# Patient Record
Sex: FEMALE | Race: ASIAN | NOT HISPANIC OR LATINO
[De-identification: names, ages, dates, MRNs, and addresses within clinical notes are randomized per-mention and may not be internally consistent; named-entity substitution may affect disease eponyms.]

---

## 2017-06-01 ENCOUNTER — APPOINTMENT (OUTPATIENT)
Dept: ORTHOPEDIC SURGERY | Facility: CLINIC | Age: 42
End: 2017-06-01

## 2017-06-01 VITALS
DIASTOLIC BLOOD PRESSURE: 81 MMHG | HEIGHT: 61 IN | WEIGHT: 140 LBS | SYSTOLIC BLOOD PRESSURE: 125 MMHG | BODY MASS INDEX: 26.43 KG/M2 | HEART RATE: 74 BPM

## 2017-06-01 DIAGNOSIS — M17.11 UNILATERAL PRIMARY OSTEOARTHRITIS, RIGHT KNEE: ICD-10-CM

## 2017-06-01 DIAGNOSIS — M23.303 OTHER MENISCUS DERANGEMENTS, UNSPECIFIED MEDIAL MENISCUS, RIGHT KNEE: ICD-10-CM

## 2017-06-19 PROBLEM — M23.303 DERANGEMENT OF MEDIAL MENISCUS OF RIGHT KNEE: Status: ACTIVE | Noted: 2017-06-19

## 2017-06-19 PROBLEM — M17.11 PRIMARY LOCALIZED OSTEOARTHRITIS OF RIGHT KNEE: Status: ACTIVE | Noted: 2017-06-19

## 2017-07-24 ENCOUNTER — OUTPATIENT (OUTPATIENT)
Dept: OUTPATIENT SERVICES | Facility: HOSPITAL | Age: 42
LOS: 1 days | End: 2017-07-24

## 2017-07-24 VITALS
DIASTOLIC BLOOD PRESSURE: 70 MMHG | RESPIRATION RATE: 16 BRPM | WEIGHT: 149.91 LBS | OXYGEN SATURATION: 97 % | TEMPERATURE: 98 F | SYSTOLIC BLOOD PRESSURE: 120 MMHG | HEART RATE: 79 BPM | HEIGHT: 61 IN

## 2017-07-24 DIAGNOSIS — S83.209A UNSPECIFIED TEAR OF UNSPECIFIED MENISCUS, CURRENT INJURY, UNSPECIFIED KNEE, INITIAL ENCOUNTER: ICD-10-CM

## 2017-07-24 DIAGNOSIS — Z98.890 OTHER SPECIFIED POSTPROCEDURAL STATES: Chronic | ICD-10-CM

## 2017-07-24 DIAGNOSIS — S83.249A OTHER TEAR OF MEDIAL MENISCUS, CURRENT INJURY, UNSPECIFIED KNEE, INITIAL ENCOUNTER: ICD-10-CM

## 2017-07-24 DIAGNOSIS — J45.909 UNSPECIFIED ASTHMA, UNCOMPLICATED: ICD-10-CM

## 2017-07-24 LAB
HCT VFR BLD CALC: 38.1 % — SIGNIFICANT CHANGE UP (ref 34.5–45)
HGB BLD-MCNC: 12.5 G/DL — SIGNIFICANT CHANGE UP (ref 11.5–15.5)
MCHC RBC-ENTMCNC: 29.4 PG — SIGNIFICANT CHANGE UP (ref 27–34)
MCHC RBC-ENTMCNC: 32.8 % — SIGNIFICANT CHANGE UP (ref 32–36)
MCV RBC AUTO: 89.6 FL — SIGNIFICANT CHANGE UP (ref 80–100)
NRBC # FLD: 0 — SIGNIFICANT CHANGE UP
PLATELET # BLD AUTO: 322 K/UL — SIGNIFICANT CHANGE UP (ref 150–400)
PMV BLD: 10.7 FL — SIGNIFICANT CHANGE UP (ref 7–13)
RBC # BLD: 4.25 M/UL — SIGNIFICANT CHANGE UP (ref 3.8–5.2)
RBC # FLD: 13.4 % — SIGNIFICANT CHANGE UP (ref 10.3–14.5)
WBC # BLD: 12.84 K/UL — HIGH (ref 3.8–10.5)
WBC # FLD AUTO: 12.84 K/UL — HIGH (ref 3.8–10.5)

## 2017-07-24 NOTE — H&P PST ADULT - NSANTHOSAYNRD_GEN_A_CORE
No. GAYLE screening performed.  STOP BANG Legend: 0-2 = LOW Risk; 3-4 = INTERMEDIATE Risk; 5-8 = HIGH Risk

## 2017-07-24 NOTE — H&P PST ADULT - PROBLEM SELECTOR PLAN 1
Pt. is scheduled for right knee arthroscopy, medial meniscectomy on 8/8/17.  Preoperative instructions reviewed, pt verbalized understanding.  Preop Famotidine provided.  Lab results pending

## 2017-07-24 NOTE — H&P PST ADULT - PMH
Asthma  last inhaler use  2005  Meniscus tear  right knee pain  Migraine    RA (rheumatoid arthritis) Asthma  last inhaler use  2005  Meniscus tear  right knee pain since April 2017  Migraine    RA (rheumatoid arthritis)

## 2017-07-24 NOTE — H&P PST ADULT - NEGATIVE GENERAL GENITOURINARY SYMPTOMS
normal urinary frequency/no renal colic/no bladder infections/no flank pain L/no hematuria/no flank pain R

## 2017-07-24 NOTE — H&P PST ADULT - FAMILY HISTORY
Father  Still living? Yes, Estimated age: Age Unknown  Family history of diabetes mellitus, Age at diagnosis: Age Unknown     Mother  Still living? Yes, Estimated age: Age Unknown  Family history of diabetes mellitus, Age at diagnosis: Age Unknown

## 2017-07-24 NOTE — H&P PST ADULT - HISTORY OF PRESENT ILLNESS
40 y/o female with history of right knee pain, climbing stairs, knee popped in April alot of pain all the time, pain is worse with stair climbing, taking Advil PRN.  MRI done, cristobal manzano 40 y/o female with right knee meniscus tear presents to PAST today for presurgical evaluation.  She complains that in April when she was climbing the stairs, she felt something "pop" in her right knee and she now has pain in her right knee all the time.  The pain is worse when she climbs stairs and sometimes the knee basil when she walks.  She is taking Advil PRN for pain.  MRI was done.  She is scheduled for right knee arthroscopy, medial meniscectomy on 8/8/17.

## 2017-08-08 ENCOUNTER — OUTPATIENT (OUTPATIENT)
Dept: OUTPATIENT SERVICES | Facility: HOSPITAL | Age: 42
LOS: 1 days | Discharge: HOME | End: 2017-08-08
Payer: COMMERCIAL

## 2017-08-08 ENCOUNTER — APPOINTMENT (OUTPATIENT)
Dept: ORTHOPEDIC SURGERY | Facility: HOSPITAL | Age: 42
End: 2017-08-08

## 2017-08-08 VITALS
OXYGEN SATURATION: 99 % | RESPIRATION RATE: 14 BRPM | SYSTOLIC BLOOD PRESSURE: 102 MMHG | DIASTOLIC BLOOD PRESSURE: 43 MMHG | TEMPERATURE: 98 F | HEART RATE: 68 BPM

## 2017-08-08 VITALS
DIASTOLIC BLOOD PRESSURE: 51 MMHG | OXYGEN SATURATION: 100 % | TEMPERATURE: 98 F | SYSTOLIC BLOOD PRESSURE: 112 MMHG | HEIGHT: 61 IN | RESPIRATION RATE: 16 BRPM | WEIGHT: 149.03 LBS | HEART RATE: 81 BPM

## 2017-08-08 DIAGNOSIS — S83.249A OTHER TEAR OF MEDIAL MENISCUS, CURRENT INJURY, UNSPECIFIED KNEE, INITIAL ENCOUNTER: ICD-10-CM

## 2017-08-08 DIAGNOSIS — Z98.890 OTHER SPECIFIED POSTPROCEDURAL STATES: Chronic | ICD-10-CM

## 2017-08-08 LAB — HCG UR QL: NEGATIVE — SIGNIFICANT CHANGE UP

## 2017-08-08 PROCEDURE — 29880 ARTHRS KNE SRG MNISECTMY M&L: CPT | Mod: LT

## 2017-08-08 RX ORDER — TRAMADOL HYDROCHLORIDE 50 MG/1
1 TABLET ORAL
Qty: 30 | Refills: 0
Start: 2017-08-08 | End: 2017-08-15

## 2017-08-08 RX ORDER — SODIUM CHLORIDE 9 MG/ML
1000 INJECTION, SOLUTION INTRAVENOUS
Qty: 0 | Refills: 0 | Status: DISCONTINUED | OUTPATIENT
Start: 2017-08-08 | End: 2017-08-08

## 2017-08-08 RX ORDER — SODIUM CHLORIDE 9 MG/ML
1000 INJECTION, SOLUTION INTRAVENOUS
Qty: 0 | Refills: 0 | Status: DISCONTINUED | OUTPATIENT
Start: 2017-08-08 | End: 2017-08-23

## 2017-08-08 RX ORDER — ONDANSETRON 8 MG/1
4 TABLET, FILM COATED ORAL ONCE
Qty: 0 | Refills: 0 | Status: DISCONTINUED | OUTPATIENT
Start: 2017-08-08 | End: 2017-08-08

## 2017-08-08 RX ORDER — FENTANYL CITRATE 50 UG/ML
50 INJECTION INTRAVENOUS
Qty: 0 | Refills: 0 | Status: DISCONTINUED | OUTPATIENT
Start: 2017-08-08 | End: 2017-08-08

## 2017-08-08 RX ORDER — OXYCODONE AND ACETAMINOPHEN 5; 325 MG/1; MG/1
1 TABLET ORAL
Qty: 40 | Refills: 0
Start: 2017-08-08

## 2017-08-08 RX ORDER — FENTANYL CITRATE 50 UG/ML
25 INJECTION INTRAVENOUS
Qty: 0 | Refills: 0 | Status: DISCONTINUED | OUTPATIENT
Start: 2017-08-08 | End: 2017-08-08

## 2017-08-08 RX ORDER — OXYCODONE HYDROCHLORIDE 5 MG/1
2.5 TABLET ORAL ONCE
Qty: 0 | Refills: 0 | Status: DISCONTINUED | OUTPATIENT
Start: 2017-08-08 | End: 2017-08-08

## 2017-08-08 RX ADMIN — SODIUM CHLORIDE 100 MILLILITER(S): 9 INJECTION, SOLUTION INTRAVENOUS at 09:05

## 2017-08-08 RX ADMIN — FENTANYL CITRATE 50 MICROGRAM(S): 50 INJECTION INTRAVENOUS at 08:50

## 2017-08-08 RX ADMIN — FENTANYL CITRATE 50 MICROGRAM(S): 50 INJECTION INTRAVENOUS at 09:18

## 2017-08-08 RX ADMIN — FENTANYL CITRATE 50 MICROGRAM(S): 50 INJECTION INTRAVENOUS at 09:00

## 2017-08-08 RX ADMIN — FENTANYL CITRATE 50 MICROGRAM(S): 50 INJECTION INTRAVENOUS at 09:30

## 2017-08-08 NOTE — BRIEF OPERATIVE NOTE - OPERATION/FINDINGS
Preop: R knee medial meniscal tear  postop: same  Proc: R knee partial medial menisectomy and lateral chondroplasty  Findings: R knee complex posterior horn tear and medial chondral wear

## 2017-08-08 NOTE — ASU PATIENT PROFILE, ADULT - NSALCOHOLPROBLEMSRELYN_GEN_A_CORE_SD
Provider





- Provider


Date of Admission: 


04/20/17 16:15





Attending physician: 


Sundar Ellington MD





Consults: 


Surgery : Dr Prieto


Time Spent in preparation of Discharge (in minutes): 20





Diagnosis





- Discharge Diagnosis


(1) Appendicitis


Status: Acute





(2) S/P laparoscopic appendectomy


Status: Acute








Hospital Course





- Lab Results


Lab Results: 


 Most Recent Lab Values











WBC  5.9 K/uL (4.8-10.8)   04/21/17  06:44    


 


RBC  3.85 Mil/uL (3.80-5.20)   04/21/17  06:44    


 


Hgb  11.5 g/dL (12.0-16.0)  L  04/21/17  06:44    


 


Hct  35.0 % (34.0-47.0)   04/21/17  06:44    


 


MCV  90.9 fl (81.0-99.0)   04/21/17  06:44    


 


MCH  30.0 pg (27.0-31.0)   04/21/17  06:44    


 


MCHC  33.0 g/dL (33.0-37.0)   04/21/17  06:44    


 


RDW  12.9 % (11.5-14.5)   04/21/17  06:44    


 


Plt Count  177 K/uL (130-400)   04/21/17  06:44    


 


MPV  8.6 fl (7.2-11.7)   04/21/17  06:44    


 


Neut % (Auto)  88.2 % (50.0-75.0)  H  04/21/17  06:44    


 


Lymph % (Auto)  9.1 % (20.0-40.0)  L  04/21/17  06:44    


 


Mono % (Auto)  2.6 % (0.0-10.0)   04/21/17  06:44    


 


Eos % (Auto)  0.0 % (0.0-4.0)   04/21/17  06:44    


 


Baso % (Auto)  0.1 % (0.0-2.0)   04/21/17  06:44    


 


Neut #  5.2 K/uL (1.8-7.0)   04/21/17  06:44    


 


Lymph #  0.5 K/uL (1.0-4.3)  L  04/21/17  06:44    


 


Mono #  0.2 K/uL (0.0-0.8)   04/21/17  06:44    


 


Eos #  0.0 K/uL (0.0-0.7)   04/21/17  06:44    


 


Baso #  0.0 K/uL (0.0-0.2)   04/21/17  06:44    


 


Neutrophils % (Manual)  86 % (42-75)  H  04/21/17  06:44    


 


Lymphocytes % (Manual)  10 % (20-50)  L  04/21/17  06:44    


 


Monocytes % (Manual)  4 % (0-10)   04/21/17  06:44    


 


Platelet Estimate  Normal  (NORMAL)   04/21/17  06:44    


 


Hypochromasia (manual)  Slight   04/21/17  06:44    


 


Anisocytosis (manual)  Slight   04/21/17  06:44    


 


Sodium  136 mmol/l (132-148)   04/21/17  06:44    


 


Potassium  4.7 MMOL/L (3.6-5.0)   04/21/17  06:44    


 


Chloride  104 mmol/L ()   04/21/17  06:44    


 


Carbon Dioxide  23 mmol/L (22-30)   04/21/17  06:44    


 


Anion Gap  14  (10-20)   04/21/17  06:44    


 


BUN  9 mg/dl (7-17)   04/21/17  06:44    


 


Creatinine  0.6 mg/dL (0.7-1.2)  L  04/21/17  06:44    


 


Est GFR ( Amer)  > 60   04/21/17  06:44    


 


Est GFR (Non-Af Amer)  > 60   04/21/17  06:44    


 


Random Glucose  132 mg/dL ()  H  04/21/17  06:44    


 


Calcium  9.3 mg/dL (8.4-10.2)   04/21/17  06:44    


 


Total Bilirubin  0.8 mg/dl (0.2-1.3)   04/20/17  12:40    


 


AST  45 U/L (14-36)  H  04/20/17  12:40    


 


ALT  68 U/L (9-52)  H  04/20/17  12:40    


 


Alkaline Phosphatase  70 U/L ()   04/20/17  12:40    


 


Total Protein  7.8 G/DL (6.3-8.2)   04/20/17  12:40    


 


Albumin  4.3 g/dL (3.5-5.0)   04/20/17  12:40    


 


Globulin  3.5 gm/dL (2.2-3.9)   04/20/17  12:40    


 


Albumin/Globulin Ratio  1.2  (1.0-2.1)   04/20/17  12:40    














- Hospital Course


Hospital Course: 


25 y/o lady , no significant PMH, came in bec of lower abdominal pain.    CT of 

the abdomen done showed Acute Appendicitis.


Pt was dmitted to Med Surg,  kept NPO, strated on IVF hydration, IV Zosyn and 

Pain meds.    Surgery was consulted and pt undewernt Laparoscopic Appendectomy.


No post op complication.  Pain controlled.  Pt tolerated PO diet.





Discharge Exam





- Head Exam


Head Exam: ATRAUMATIC, NORMAL INSPECTION, NORMOCEPHALIC





- Eye Exam


Eye Exam: EOMI, Normal appearance, PERRL


Pupil Exam: NORMAL ACCOMODATION





- ENT Exam


ENT Exam: Mucous Membranes Moist, Normal External Ear Exam





- Neck Exam


Neck exam: Full Rom





- Respiratory Exam


Respiratory Exam: NORMAL BREATHING PATTERN.  absent: Respiratory Distress





- Cardiovascular Exam


Cardiovascular Exam: REGULAR RHYTHM, +S1, +S2





- GI/Abdominal Exam


GI & Abdominal Exam: Normal Bowel Sounds, Soft, Tenderness (very minimal lower 

abd tenderness,  small Lap AP surgical wound with dressing)





- Extremities Exam


Extremities exam: full ROM, normal capillary refill, normal inspection, pedal 

pulses present





- Back Exam


Back exam: FULL ROM, NORMAL INSPECTION.  absent: CVA tenderness (L), CVA 

tenderness (R), vertebral tenderness





- Neurological Exam


Neurological exam: Alert, CN II-XII Intact, Normal Gait, Oriented x3, Reflexes 

Normal





- Psychiatric Exam


Psychiatric exam: Normal Affect, Normal Mood





- Skin


Skin Exam: Dry, Normal Color, Warm





Discharge Plan





- Discharge Medications


Prescriptions: 


Amoxicillin/Clavulanate [Augmentin 875 MG-125 MG] 1 tab PO BID #10 tab


traMADol [Ultram] 50 mg PO TID PRN #15 tab


 PRN Reason: Pain, Moderate (4-7)





- Follow Up Plan


Condition: GOOD


Disposition: HOME/ ROUTINE


Instructions:  Appendicitis (DC)


Additional Instructions: 


Pt may resume regular diet and light activities, avoid any heavy lifting >10lbs 

for 4 weeks.  Pt may take Motrin or Tyenol as needed for pain.  She may shower 

and wash with soap and water, do not remove dermabond glue and no bathing or 

swimming.  F/U in office with Dr. Prieto in 2 weeks.


Referrals: 


[facility] [Outside]


Michael Prieto MD [Staff Provider] - no

## 2017-08-08 NOTE — ASU PATIENT PROFILE, ADULT - PMH
Asthma  last inhaler use  2005  Meniscus tear  right knee pain since April 2017  Migraine    RA (rheumatoid arthritis)

## 2017-08-08 NOTE — ASU DISCHARGE PLAN (ADULT/PEDIATRIC). - MEDICATION SUMMARY - MEDICATIONS TO TAKE
I will START or STAY ON the medications listed below when I get home from the hospital:    Percocet 5/325 325 mg-5 mg oral tablet  -- 1-2 tab(s) by mouth every 4- 6 hours, As Needed for pain MDD:6  -- Caution federal law prohibits the transfer of this drug to any person other  than the person for whom it was prescribed.  May cause drowsiness.  Alcohol may intensify this effect.  Use care when operating dangerous machinery.  This prescription cannot be refilled.  This product contains acetaminophen.  Do not use  with any other product containing acetaminophen to prevent possible liver damage.  Using more of this medication than prescribed may cause serious breathing problems.    -- Indication: For pain    traMADol 50 mg oral tablet  -- 1 tab(s) by mouth every 4 hours, As Needed MDD:6  -- Caution federal law prohibits the transfer of this drug to any person other  than the person for whom it was prescribed.  May cause drowsiness.  Alcohol may intensify this effect.  Use care when operating dangerous machinery.  Obtain medical advice before taking any non-prescription drugs as some may affect the action of this medication.    -- Indication: For pain    Ventolin HFA 90 mcg/inh inhalation aerosol  -- 2 puff(s) inhaled 4 times a day, As Needed  -- Indication: For home med

## 2017-08-09 ENCOUNTER — TRANSCRIPTION ENCOUNTER (OUTPATIENT)
Age: 42
End: 2017-08-09

## 2017-08-24 ENCOUNTER — APPOINTMENT (OUTPATIENT)
Dept: ORTHOPEDIC SURGERY | Facility: CLINIC | Age: 42
End: 2017-08-24
Payer: MEDICAID

## 2017-08-24 VITALS
HEIGHT: 61 IN | HEART RATE: 72 BPM | DIASTOLIC BLOOD PRESSURE: 80 MMHG | BODY MASS INDEX: 26.43 KG/M2 | WEIGHT: 140 LBS | SYSTOLIC BLOOD PRESSURE: 122 MMHG

## 2017-08-24 PROCEDURE — 99024 POSTOP FOLLOW-UP VISIT: CPT

## 2017-09-28 ENCOUNTER — APPOINTMENT (OUTPATIENT)
Dept: ORTHOPEDIC SURGERY | Facility: CLINIC | Age: 42
End: 2017-09-28
Payer: MEDICAID

## 2017-09-28 VITALS — SYSTOLIC BLOOD PRESSURE: 121 MMHG | DIASTOLIC BLOOD PRESSURE: 81 MMHG | HEART RATE: 79 BPM

## 2017-09-28 DIAGNOSIS — Z98.890 OTHER SPECIFIED POSTPROCEDURAL STATES: ICD-10-CM

## 2017-09-28 PROCEDURE — 99024 POSTOP FOLLOW-UP VISIT: CPT

## 2017-10-23 PROBLEM — Z98.890 S/P ARTHROSCOPY OF RIGHT KNEE: Status: ACTIVE | Noted: 2017-08-24

## 2018-07-16 PROBLEM — S83.209A UNSPECIFIED TEAR OF UNSPECIFIED MENISCUS, CURRENT INJURY, UNSPECIFIED KNEE, INITIAL ENCOUNTER: Chronic | Status: ACTIVE | Noted: 2017-07-24

## 2019-03-05 NOTE — ASU PREOP CHECKLIST - NS PREOP CHK HIBICLENS NA
Physical Therapy Daily Treatment    Visit Count: 2  Plan of Care: 3/1/2019 Through: 5/10/2019  Insurance Information:  Hard Visit Limit: 75 visits per calendar year  Referred by: Linette Lr MD; Next provider visit (if known/scheduled): as needed  Precautions: none    SUBJECTIVE   Patient reports being a little sore from starting her exercises but overall is doing well.    Current Pain (0-10 scale): 5/6  Functional Change: none     OBJECTIVE       Treatment     Therapeutic Exercise:   Standing hip abduction (red band at ankles), 1 x 10 R/L  Standing hip extension (red band at ankles), 1 x 10 R/L  Q-ped hip extension - donkey kicks, 1 x 10 alternating  Seated hip ER against red resistance, 1 x 10 R/L    Manual Therapy:   Hip PROM in supine to patient tolerance   Flexion, Extension, Internal Rotation, External Rotation  Supine log rolling mobilization  Supine inferior joint mobilization with distraction   Knee bent over shoulder   With belt  Supine lateral joint mobilization with distraction   Knee bent over shoulder   With belt    Skilled input: verbal instruction/cues    Home Program:   Exercise: Date issued Date DC Comments   DL bridge  Sidelying hip abduction  Sidelying clams  Figure 4 stretch  Couch hip flexor stretch     3/1/19                                 Writer verbally educated the patient and received verbal consent from the patient on hand placement, positioning of patient, and techniques to be performed today including therapist position for techniques, hand placement and palpation for techniques as described above and how they are pertinent to the patient's plan of care.      Suggestions for next session as indicated: progress per plan of care, belt mobilizations    ASSESSMENT   Patient with improved hip flexion from 90 deg to 110 degrees following manual mobilizations during her session today.  Will review things that she can do in the pool at her next session    Pain after treatment  (patient reported, 0-10 scale): not reported  Result of above outlined education: Verbalizes understanding and Demonstrates understanding    THERAPY DAILY BILLING   Insurance: [employer] 2. N/A    Evaluation Procedures:  No evaluation codes were used on this date of service    Timed Procedures:  Manual Therapy, 25 minutes  Therapeutic Exercise, 15 minutes    Untimed Procedures:  No untimed codes were used on this date of service    Total Treatment Time: 40 minutes   N/A

## 2022-04-07 ENCOUNTER — APPOINTMENT (OUTPATIENT)
Dept: OBGYN | Facility: CLINIC | Age: 47
End: 2022-04-07
Payer: MEDICAID

## 2022-04-07 ENCOUNTER — ASOB RESULT (OUTPATIENT)
Age: 47
End: 2022-04-07

## 2022-04-07 VITALS
HEART RATE: 82 BPM | DIASTOLIC BLOOD PRESSURE: 79 MMHG | WEIGHT: 176 LBS | SYSTOLIC BLOOD PRESSURE: 137 MMHG | BODY MASS INDEX: 33.23 KG/M2 | HEIGHT: 61 IN

## 2022-04-07 DIAGNOSIS — Z83.3 FAMILY HISTORY OF DIABETES MELLITUS: ICD-10-CM

## 2022-04-07 PROCEDURE — 99203 OFFICE O/P NEW LOW 30 MIN: CPT

## 2022-04-07 PROCEDURE — 76830 TRANSVAGINAL US NON-OB: CPT

## 2022-04-07 NOTE — PHYSICAL EXAM
[FreeTextEntry7] : Obese, soft, slight guarding, no rebound [Labia Majora] : normal [Normal] : normal [FreeTextEntry6] : Unable to palpate secondary to habitus.

## 2022-04-07 NOTE — DISCUSSION/SUMMARY
[FreeTextEntry1] : 45 yo P2 w/HMB and dysmenorrhea in setting of suspected fibroids.\par \par Plan\par USG (today?)\par Pt to obtain records from EM bx (~1 yr ago)\par TEB for results and plan.

## 2022-05-05 ENCOUNTER — APPOINTMENT (OUTPATIENT)
Dept: OBGYN | Facility: CLINIC | Age: 47
End: 2022-05-05
Payer: MEDICAID

## 2022-05-05 PROCEDURE — 99213 OFFICE O/P EST LOW 20 MIN: CPT | Mod: 95

## 2022-05-05 NOTE — HISTORY OF PRESENT ILLNESS
[FreeTextEntry1] : 47 yo P2 here for f/u after USG.\par \par Pt will find and send results of EM bx from 7/2021.

## 2022-05-26 ENCOUNTER — NON-APPOINTMENT (OUTPATIENT)
Age: 47
End: 2022-05-26

## 2022-05-26 ENCOUNTER — APPOINTMENT (OUTPATIENT)
Dept: ORTHOPEDIC SURGERY | Facility: CLINIC | Age: 47
End: 2022-05-26
Payer: COMMERCIAL

## 2022-06-08 ENCOUNTER — APPOINTMENT (OUTPATIENT)
Dept: ORTHOPEDIC SURGERY | Facility: CLINIC | Age: 47
End: 2022-06-08
Payer: COMMERCIAL

## 2022-06-08 VITALS — BODY MASS INDEX: 32.39 KG/M2 | WEIGHT: 165 LBS | HEIGHT: 60 IN

## 2022-06-08 DIAGNOSIS — M25.561 PAIN IN RIGHT KNEE: ICD-10-CM

## 2022-06-08 PROCEDURE — 73562 X-RAY EXAM OF KNEE 3: CPT | Mod: RT

## 2022-06-08 PROCEDURE — 99204 OFFICE O/P NEW MOD 45 MIN: CPT

## 2022-06-09 NOTE — DISCUSSION/SUMMARY
[de-identified] : 46 year old female with mild to moderate osteoarthritis in the right knee. Patient will start with conservative treatment and treatment options were discussed including NSAIDs, ice, physical therapy, corticosteroid injections, viscosupplementation. Patient currently working with therapy. Continue home exercises. FU in 3 months.

## 2022-06-09 NOTE — HISTORY OF PRESENT ILLNESS
[de-identified] : Patient is a 46 year-old female who presents to the office today for initial evaluation of right knee pain. She has had pain for about a year. Pain has been worsening over time. She describes throbbing pain associated with swelling and stiffness. She takes Ibuprofen 600mg which used to work but not anymore. Difficulty going up and down stairs. She has noticed recently that she feels instability. She presents today for further treatment options.\par \par About 5 years ago, meniscectomies performed of bilateral knees (Dr. Little)

## 2022-06-09 NOTE — ADDENDUM
[FreeTextEntry1] : I, Walker Veras, acted solely as a scribe for Dr. Arelis Bean MD on this date 06/08/2022  .\par  \par All medical record entries made by the Scribe were at my, Dr. Arelis Bean MD , direction and personally dictated by me on 06/08/2022 . I have reviewed the chart and agree that the record accurately reflects my personal performance of the history, physical exam, assessment and plan. I have also personally directed, reviewed, and agreed with the chart.

## 2022-06-09 NOTE — PHYSICAL EXAM
[Normal RLE] : Right Lower Extremity: No scars, rashes, lesions, ulcers, skin intact [Normal LLE] : Left Lower Extremity: No scars, rashes, lesions, ulcers, skin intact [Normal Touch] : sensation intact for touch [Normal] : No respiratory distress and lungs were clear to auscultation bilaterally [de-identified] : Patient appears stated age in no acute respiratory distress. Patient is alert oriented x3. Patient has normal mood and affect. \par \par Bilateral knee exam\par Range of motion of the knee is 0-120°. \par Skin is normal. No rash.\par There is no effusion. No medial or lateral joint line tenderness. No swelling, no pitting edema.\par Overall alignment of the knee is then slight varus. Good anterior posterior stability. Firm endpoints on anterior and posterior drawer. \par Medial lateral stability is intact. Firm endpoint on medial and lateral stress testing .\par Salma test is negative.\par Quadriceps strength 5/ 5. There is no loss of muscle volume in the thigh. \par Good anterior posterior and mediolateral stability.\par Sensation in the extremities intact. \par Discrimination is intact. Good DP and PT pulses.\par 		\par Bilateral hip exam\par On inspection of the hip shows skin is normal. No evidence of rash. \par No loss of muscle. Abductor strength is 5 out of 5. Hip flexor strength is 5.\par Range of motion of the hip at 90° flexion internal rotation is 15° external rotation is 30° pain-free. \par Hip has good stability in anterior and posterior direction. \par On lateral decubitus examination there is no tenderness in the greater trochanter. \par \par Lower Extremity Examination \par Bilateral lower extremity skin is normal. There is no rash. There is no edema and lymphadenopathy. DP and PT pulses intact. Sensation is intact.  [de-identified] : X-rays of the right knee 3 views obtained today 06/08/2022 shows mild to moderate osteoarthritis.

## 2022-07-12 ENCOUNTER — APPOINTMENT (OUTPATIENT)
Dept: ORTHOPEDIC SURGERY | Facility: CLINIC | Age: 47
End: 2022-07-12

## 2022-09-29 ENCOUNTER — APPOINTMENT (OUTPATIENT)
Dept: OBGYN | Facility: CLINIC | Age: 47
End: 2022-09-29

## 2022-09-29 NOTE — DISCUSSION/SUMMARY
[FreeTextEntry1] : 45 yo P2 w/HMB, scheduled for vNOTES TLH.\par Pt now states that she would like to do combined case w/"tummy tuck" with Dr. Santoyo.\par Questions answered.\par \par Will call Dr. Santoyo to try to coordinate surgery on a different day.

## 2022-10-12 ENCOUNTER — APPOINTMENT (OUTPATIENT)
Dept: OBGYN | Facility: CLINIC | Age: 47
End: 2022-10-12

## 2022-10-14 ENCOUNTER — APPOINTMENT (OUTPATIENT)
Dept: OBGYN | Facility: HOSPITAL | Age: 47
End: 2022-10-14

## 2022-11-08 ENCOUNTER — APPOINTMENT (OUTPATIENT)
Dept: OBGYN | Facility: CLINIC | Age: 47
End: 2022-11-08

## 2022-11-29 ENCOUNTER — APPOINTMENT (OUTPATIENT)
Dept: OBGYN | Facility: CLINIC | Age: 47
End: 2022-11-29

## 2022-12-23 ENCOUNTER — OUTPATIENT (OUTPATIENT)
Dept: OUTPATIENT SERVICES | Facility: HOSPITAL | Age: 47
LOS: 1 days | End: 2022-12-23

## 2022-12-23 VITALS
WEIGHT: 166.89 LBS | HEART RATE: 80 BPM | TEMPERATURE: 97 F | HEIGHT: 60 IN | RESPIRATION RATE: 16 BRPM | SYSTOLIC BLOOD PRESSURE: 130 MMHG | DIASTOLIC BLOOD PRESSURE: 70 MMHG | OXYGEN SATURATION: 99 %

## 2022-12-23 DIAGNOSIS — D25.9 LEIOMYOMA OF UTERUS, UNSPECIFIED: ICD-10-CM

## 2022-12-23 DIAGNOSIS — Z98.890 OTHER SPECIFIED POSTPROCEDURAL STATES: Chronic | ICD-10-CM

## 2022-12-23 DIAGNOSIS — J45.909 UNSPECIFIED ASTHMA, UNCOMPLICATED: ICD-10-CM

## 2022-12-23 LAB
A1C WITH ESTIMATED AVERAGE GLUCOSE RESULT: 7.3 % — HIGH (ref 4–5.6)
ANION GAP SERPL CALC-SCNC: 14 MMOL/L — SIGNIFICANT CHANGE UP (ref 7–14)
BLD GP AB SCN SERPL QL: NEGATIVE — SIGNIFICANT CHANGE UP
BUN SERPL-MCNC: 10 MG/DL — SIGNIFICANT CHANGE UP (ref 7–23)
CALCIUM SERPL-MCNC: 9.6 MG/DL — SIGNIFICANT CHANGE UP (ref 8.4–10.5)
CHLORIDE SERPL-SCNC: 104 MMOL/L — SIGNIFICANT CHANGE UP (ref 98–107)
CO2 SERPL-SCNC: 21 MMOL/L — LOW (ref 22–31)
CREAT SERPL-MCNC: 0.52 MG/DL — SIGNIFICANT CHANGE UP (ref 0.5–1.3)
EGFR: 115 ML/MIN/1.73M2 — SIGNIFICANT CHANGE UP
ESTIMATED AVERAGE GLUCOSE: 163 — SIGNIFICANT CHANGE UP
GLUCOSE SERPL-MCNC: 97 MG/DL — SIGNIFICANT CHANGE UP (ref 70–99)
HCG SERPL-ACNC: <5 MIU/ML — SIGNIFICANT CHANGE UP
HCT VFR BLD CALC: 39.6 % — SIGNIFICANT CHANGE UP (ref 34.5–45)
HGB BLD-MCNC: 12.5 G/DL — SIGNIFICANT CHANGE UP (ref 11.5–15.5)
MCHC RBC-ENTMCNC: 28.5 PG — SIGNIFICANT CHANGE UP (ref 27–34)
MCHC RBC-ENTMCNC: 31.6 GM/DL — LOW (ref 32–36)
MCV RBC AUTO: 90.2 FL — SIGNIFICANT CHANGE UP (ref 80–100)
NRBC # BLD: 0 /100 WBCS — SIGNIFICANT CHANGE UP (ref 0–0)
NRBC # FLD: 0 K/UL — SIGNIFICANT CHANGE UP (ref 0–0)
PLATELET # BLD AUTO: 340 K/UL — SIGNIFICANT CHANGE UP (ref 150–400)
POTASSIUM SERPL-MCNC: 4 MMOL/L — SIGNIFICANT CHANGE UP (ref 3.5–5.3)
POTASSIUM SERPL-SCNC: 4 MMOL/L — SIGNIFICANT CHANGE UP (ref 3.5–5.3)
RBC # BLD: 4.39 M/UL — SIGNIFICANT CHANGE UP (ref 3.8–5.2)
RBC # FLD: 14.5 % — SIGNIFICANT CHANGE UP (ref 10.3–14.5)
RH IG SCN BLD-IMP: POSITIVE — SIGNIFICANT CHANGE UP
SODIUM SERPL-SCNC: 139 MMOL/L — SIGNIFICANT CHANGE UP (ref 135–145)
WBC # BLD: 11.62 K/UL — HIGH (ref 3.8–10.5)
WBC # FLD AUTO: 11.62 K/UL — HIGH (ref 3.8–10.5)

## 2022-12-23 RX ORDER — ALBUTEROL 90 UG/1
2 AEROSOL, METERED ORAL
Qty: 0 | Refills: 0 | DISCHARGE

## 2022-12-23 RX ORDER — SODIUM CHLORIDE 9 MG/ML
1000 INJECTION, SOLUTION INTRAVENOUS
Refills: 0 | Status: DISCONTINUED | OUTPATIENT
Start: 2023-01-06 | End: 2023-01-20

## 2022-12-23 NOTE — H&P PST ADULT - LAST ECHOCARDIOGRAM
Pt is already s/p augmentin so will change to bactrim as directed  Increase fluids  Coricidin as directed and may try sinus rinsing  Denies

## 2022-12-23 NOTE — H&P PST ADULT - NEGATIVE ENMT SYMPTOMS
Denies dentures. Denies loose teeth./no hearing difficulty/no ear pain/no tinnitus/no vertigo/no sinus symptoms/no nasal congestion/no dysphagia

## 2022-12-23 NOTE — H&P PST ADULT - HISTORY OF PRESENT ILLNESS
47 year old female with  PMH of  Asthma (not on medications- no exacerbations) presents to Presurgical testing with diagnosis of leiomyoma of uterus unspecified scheduled for total laparoscopic hysterectomy, vaginal natural orifice transluminal endoscopic surgery.

## 2022-12-23 NOTE — H&P PST ADULT - PROBLEM SELECTOR PLAN 1
Patient tentatively scheduled for total laparoscopic hysterectomy, vaginal natural orifice transluminal endoscopic surgery on 1/6/23.  Pre-op instructions provided. Pt given verbal and written instructions with teach back on chlorhexidine wash and pepcid. Pt verbalized understanding with return demonstration.   Preop Covid PCR test ordered .Instructions regarding covid PCR test to be obtained 3- 5 days prior to surgery and locations for covid testing site provided. Pt verbalized understanding.  Urine cup provided for day of procedure for pregnancy test.

## 2022-12-23 NOTE — H&P PST ADULT - NSICDXPASTMEDICALHX_GEN_ALL_CORE_FT
PAST MEDICAL HISTORY:  Asthma last inhaler use  2005    Meniscus tear right knee pain since April 2017    Migraine

## 2022-12-23 NOTE — H&P PST ADULT - CARDIOVASCULAR
Direct Admit. Dr. Mcdaniels Sat perfect served regardig admission. Pt admitted to room 3303 from ED. VSS on room air. O x 4. Pt lives home and was experiencing CP. Has been two two hospital in the las three days. Pt independent. Refused alarm. Walked 40 ft to/from bathroom; ambulated well Pt oriented to room and updated on POC. Pt understands and has no further questions. Call light and bedside table within reach. Safety socks on and  bed in lowest position with 2/4 siderails up. Telemetry verified. Report taken from Kyle Duarte at [facility]. ([phone]). Had been to ED twice in last few days for CP. HX: HTN, EGD, colonoscopy, neck pain. Given toradol, nitropaste, lorazapam. At this hospital: Sodium was 126. Pt is a chronic drinker. 1L banana bag given in ED. Pt report that he drinks 7-8 beers a day but has cut down in the last week. He was on HCTZ, but his sodium was low so his PCP d/c'd HCTZ and told him to drink less alcohol. Last beer was yesterday at 2 pm (he had two beers total yesterday). details… normal/regular rate and rhythm/S1 S2 present/no gallops/no rub/no murmur

## 2022-12-29 ENCOUNTER — APPOINTMENT (OUTPATIENT)
Dept: OBGYN | Facility: CLINIC | Age: 47
End: 2022-12-29

## 2022-12-29 PROCEDURE — 99212 OFFICE O/P EST SF 10 MIN: CPT | Mod: 95

## 2022-12-29 NOTE — HISTORY OF PRESENT ILLNESS
[FreeTextEntry1] : 47 yo P2 here for preop chat prior to vNOTES TLH for HMB and dysmenorrhea.\par \par Pt s/p visit w/primary care for med clearance.\par Also s/p PST. \par

## 2022-12-29 NOTE — DISCUSSION/SUMMARY
[FreeTextEntry1] : Case reviewed.\par Questions answered.\par Pt will keep appointment for med clearance.\par Keep appointment for surgery.

## 2023-01-05 ENCOUNTER — TRANSCRIPTION ENCOUNTER (OUTPATIENT)
Age: 48
End: 2023-01-05

## 2023-01-05 NOTE — ASU PATIENT PROFILE, ADULT - FALL HARM RISK - UNIVERSAL INTERVENTIONS
Bed in lowest position, wheels locked, appropriate side rails in place/Call bell, personal items and telephone in reach/Instruct patient to call for assistance before getting out of bed or chair/Non-slip footwear when patient is out of bed/[place] to call system/Physically safe environment - no spills, clutter or unnecessary equipment/Purposeful Proactive Rounding/Room/bathroom lighting operational, light cord in reach

## 2023-01-05 NOTE — ASU PATIENT PROFILE, ADULT - NS TRANSFER EYEGLASSES PAIRS
1 pair Thalidomide Counseling: I discussed with the patient the risks of thalidomide including but not limited to birth defects, anxiety, weakness, chest pain, dizziness, cough and severe allergy.

## 2023-01-05 NOTE — ASU PATIENT PROFILE, ADULT - HEALTHCARE QUESTIONS, PROFILE
[FreeTextEntry1] : \par -Patient healing well\par -Postoperative precautions given\par Further care with benign gyn\par 
denies

## 2023-01-06 ENCOUNTER — TRANSCRIPTION ENCOUNTER (OUTPATIENT)
Age: 48
End: 2023-01-06

## 2023-01-06 ENCOUNTER — APPOINTMENT (OUTPATIENT)
Dept: OBGYN | Facility: HOSPITAL | Age: 48
End: 2023-01-06

## 2023-01-06 ENCOUNTER — OUTPATIENT (OUTPATIENT)
Dept: OUTPATIENT SERVICES | Facility: HOSPITAL | Age: 48
LOS: 1 days | Discharge: HOME | End: 2023-01-06
Payer: COMMERCIAL

## 2023-01-06 ENCOUNTER — RESULT REVIEW (OUTPATIENT)
Age: 48
End: 2023-01-06

## 2023-01-06 VITALS
DIASTOLIC BLOOD PRESSURE: 76 MMHG | OXYGEN SATURATION: 99 % | HEART RATE: 84 BPM | WEIGHT: 166.89 LBS | RESPIRATION RATE: 16 BRPM | TEMPERATURE: 98 F | SYSTOLIC BLOOD PRESSURE: 135 MMHG | HEIGHT: 60 IN

## 2023-01-06 VITALS
HEART RATE: 82 BPM | SYSTOLIC BLOOD PRESSURE: 118 MMHG | DIASTOLIC BLOOD PRESSURE: 54 MMHG | RESPIRATION RATE: 16 BRPM | OXYGEN SATURATION: 98 %

## 2023-01-06 DIAGNOSIS — Z98.890 OTHER SPECIFIED POSTPROCEDURAL STATES: Chronic | ICD-10-CM

## 2023-01-06 DIAGNOSIS — D25.9 LEIOMYOMA OF UTERUS, UNSPECIFIED: ICD-10-CM

## 2023-01-06 LAB
GLUCOSE BLDC GLUCOMTR-MCNC: 161 MG/DL — HIGH (ref 70–99)
HCG UR QL: NEGATIVE — SIGNIFICANT CHANGE UP
HCT VFR BLD CALC: 35.4 % — SIGNIFICANT CHANGE UP (ref 34.5–45)
HGB BLD-MCNC: 11.3 G/DL — LOW (ref 11.5–15.5)
MCHC RBC-ENTMCNC: 29 PG — SIGNIFICANT CHANGE UP (ref 27–34)
MCHC RBC-ENTMCNC: 31.9 GM/DL — LOW (ref 32–36)
MCV RBC AUTO: 90.8 FL — SIGNIFICANT CHANGE UP (ref 80–100)
NRBC # BLD: 0 /100 WBCS — SIGNIFICANT CHANGE UP (ref 0–0)
NRBC # FLD: 0 K/UL — SIGNIFICANT CHANGE UP (ref 0–0)
PLATELET # BLD AUTO: 330 K/UL — SIGNIFICANT CHANGE UP (ref 150–400)
RBC # BLD: 3.9 M/UL — SIGNIFICANT CHANGE UP (ref 3.8–5.2)
RBC # FLD: 14.1 % — SIGNIFICANT CHANGE UP (ref 10.3–14.5)
WBC # BLD: 20.9 K/UL — HIGH (ref 3.8–10.5)
WBC # FLD AUTO: 20.9 K/UL — HIGH (ref 3.8–10.5)

## 2023-01-06 PROCEDURE — 58552 LAPARO-VAG HYST INCL T/O: CPT

## 2023-01-06 PROCEDURE — 88307 TISSUE EXAM BY PATHOLOGIST: CPT | Mod: 26

## 2023-01-06 DEVICE — ARISTA 3GR: Type: IMPLANTABLE DEVICE | Status: FUNCTIONAL

## 2023-01-06 RX ORDER — ACETAMINOPHEN 500 MG
2 TABLET ORAL
Qty: 32 | Refills: 0
Start: 2023-01-06 | End: 2023-01-09

## 2023-01-06 RX ORDER — SODIUM CHLORIDE 9 MG/ML
1000 INJECTION, SOLUTION INTRAVENOUS
Refills: 0 | Status: DISCONTINUED | OUTPATIENT
Start: 2023-01-06 | End: 2023-01-20

## 2023-01-06 RX ORDER — OXYCODONE HYDROCHLORIDE 5 MG/1
1 TABLET ORAL
Qty: 8 | Refills: 0
Start: 2023-01-06

## 2023-01-06 RX ORDER — IBUPROFEN 200 MG
3 TABLET ORAL
Qty: 48 | Refills: 0
Start: 2023-01-06 | End: 2023-01-09

## 2023-01-06 RX ORDER — SENNA PLUS 8.6 MG/1
2 TABLET ORAL
Qty: 20 | Refills: 0
Start: 2023-01-06

## 2023-01-06 RX ORDER — OXYCODONE HYDROCHLORIDE 5 MG/1
5 TABLET ORAL ONCE
Refills: 0 | Status: DISCONTINUED | OUTPATIENT
Start: 2023-01-06 | End: 2023-01-06

## 2023-01-06 RX ORDER — CHLORHEXIDINE GLUCONATE 213 G/1000ML
1 SOLUTION TOPICAL ONCE
Refills: 0 | Status: COMPLETED | OUTPATIENT
Start: 2023-01-06 | End: 2023-01-06

## 2023-01-06 RX ADMIN — OXYCODONE HYDROCHLORIDE 5 MILLIGRAM(S): 5 TABLET ORAL at 13:00

## 2023-01-06 RX ADMIN — SODIUM CHLORIDE 125 MILLILITER(S): 9 INJECTION, SOLUTION INTRAVENOUS at 12:11

## 2023-01-06 RX ADMIN — SODIUM CHLORIDE 30 MILLILITER(S): 9 INJECTION, SOLUTION INTRAVENOUS at 06:39

## 2023-01-06 RX ADMIN — CHLORHEXIDINE GLUCONATE 1 APPLICATION(S): 213 SOLUTION TOPICAL at 06:40

## 2023-01-06 RX ADMIN — OXYCODONE HYDROCHLORIDE 5 MILLIGRAM(S): 5 TABLET ORAL at 13:30

## 2023-01-06 NOTE — BRIEF OPERATIVE NOTE - NSICDXBRIEFPREOP_GEN_ALL_CORE_FT
PRE-OP DIAGNOSIS:  Abnormal uterine bleeding (AUB) 06-Jan-2023 11:10:49  Krystal Peguero  Fibroid uterus 06-Jan-2023 11:10:56  Krystal Peguero

## 2023-01-06 NOTE — ASU DISCHARGE PLAN (ADULT/PEDIATRIC) - NS MD DC FALL RISK RISK
For information on Fall & Injury Prevention, visit: [email or web address] OR  [email or web address] OR  https://www.cdc.gov/steadi/patient.html

## 2023-01-06 NOTE — ASU PREOP CHECKLIST - BMI (KG/M2)
Pt arrived for prolia injection. Dr Velasco's office called regarding recent CMP results, spoke with Darcy in the office and states pts calcium level was 9.3 that was drawn on 9-. Med Rec reviewed and pt taking calcium and vit D supp. No complaints voiced, injection given without complication. Instructions given to pt for next appt and number given to schedule, and to call Dr Swan office for any concerns of questions. Pt vu  
32.6

## 2023-01-06 NOTE — BRIEF OPERATIVE NOTE - OPERATION/FINDINGS
Exam under anesthesia notable for bulky mobile 10w sized uterus with irregular contour. Endoscopic findings notable for fibroid uterus, grossly normal appearing bilateral ovaries, fallopian tubes, bowel, and liver edge. Cystoscopy findings notable for grossly normal appearing bladder wall and mucosa without injury or foreign objects, bilateral ureteral jets visualized with active expulsion of urine.

## 2023-01-06 NOTE — CHART NOTE - NSCHARTNOTEFT_GEN_A_CORE
S: Patient seen and evaluated at bedside.  Pt awake and alert resting comfortably in bed.   Patient reports pain controlled with analgesia. Pt denies N/V, SOB, CP, palpitations, fever/chills. Tolerating clears.  Not OOB yet. Still DTV     O:   T(C): 36.4 (01-06-23 @ 13:00), Max: 37.2 (01-06-23 @ 11:25)  HR: 84 (01-06-23 @ 13:00) (71 - 89)  BP: 120/54 (01-06-23 @ 13:00) (110/59 - 122/61)  RR: 16 (01-06-23 @ 13:00) (14 - 16)  SpO2: 95% (01-06-23 @ 13:00) (95% - 100%)  Wt(kg): --  I&O's Summary    06 Jan 2023 07:01  -  06 Jan 2023 13:15  --------------------------------------------------------  IN: 475 mL / OUT: 0 mL / NET: 475 mL        Gen: Resting comfortably in bed, NAD  CV: S1S2, RRR  Lungs: CTA B/L  Abd: soft, appropriately tender,   No vaginal bleeding on pad. Last changed 1 hour ago w/ report of scant serosanguinous fluid noted at that time per bedside nurse.   Ext: SCD's in place and functional, non-tender b/l, no edema    Labs:             11.3   20.90 )-----------( 330      ( 01-06 @ 11:29 )             35.4         MEDICATIONS  (STANDING):  lactated ringers. 1000 milliLiter(s) (30 mL/Hr) IV Continuous <Continuous>  lactated ringers. 1000 milliLiter(s) (125 mL/Hr) IV Continuous <Continuous>    MEDICATIONS  (PRN):        A/P: 47y Female s/p LSC vNOTE hysterectomy, bilateral salpingectomy, and cystoscopy    Neuro: PO Analgesia PRN  CV: Hemodynamically stable.  Monitor VS. Stat H/H as above. Appropriate for surgical blood loss.   Pulm: Saturating well on room air.  Encourage OOB and incentive spirometer use.   GI: Advance to regular diet. Anti-emetics PRN.  : DTV by 7:30pm   FEN: Electrolytes: LR@125cc/hr.   Heme: DVT ppx w/ SCD's while in bed. Early ambulation, initially with assistance then as tolerated.    ID: Afebrile  Endo: No active issues   Dispo: Discharge from PACU when criteria met.     Janine Velasco, PGY-2

## 2023-01-06 NOTE — ASU PREOP CHECKLIST - AS BP NONINV METHOD
Symptoms of Retinal tear and detachment reviewed. Patient understands to call immediately with any such symptoms. electronic

## 2023-01-06 NOTE — BRIEF OPERATIVE NOTE - NSICDXBRIEFPOSTOP_GEN_ALL_CORE_FT
Called and talked with Dr. Taveras.patient is having hallucinations and notes that she apparently was recently bumped up on the lamotrigine.  He states he have a call out to Dr. Fried to find out his thoughts but apparently the patient is violent and aggressive which is new behavior.  I told him that I briefly looked at the chart and saw that she had not had an EEG that I can see any time recently and that may help in terms of figuring out what to do behaviors if this is potential seizures.  After dr taveras and I had discussion together, thought it would be the best idea to send her back to the emergency room for medical evaluation and then they could request tele psychiatry consult there; if they recommend in-patient care than they should be able to transfer her to [place].  He agreed   POST-OP DIAGNOSIS:  Abnormal uterine bleeding (AUB) 07-Jan-2023 10:27:56  Elida Mata  Fibroid uterus 07-Jan-2023 10:28:05  Elida Mata

## 2023-01-06 NOTE — ASU DISCHARGE PLAN (ADULT/PEDIATRIC) - FOLLOW UP APPOINTMENTS
[facility], [facility] may also call Recovery Room (PACU) 24/7 @ [phone]/[facility], [facility]

## 2023-01-06 NOTE — BRIEF OPERATIVE NOTE - NSICDXBRIEFPROCEDURE_GEN_ALL_CORE_FT
PROCEDURES:  Hysterectomy, endoscopic, vaginal transluminal natural orifice approach 06-Jan-2023 11:10:41  Krystal Peguero   PROCEDURES:  Cystoscopy 07-Jan-2023 10:27:43  Elida Mata   PROCEDURES:  Hysterectomy, endoscopic, vaginal transluminal natural orifice approach 06-Jan-2023 11:10:41  Krystal Peguero  Cystoscopy 07-Jan-2023 10:27:43  Elida Mata

## 2023-01-06 NOTE — ASU DISCHARGE PLAN (ADULT/PEDIATRIC) - CARE PROVIDER_API CALL
Robert Garcia  OBSTETRICS AND GYNECOLOGY  [id number] [place] [address]  Phone: [phone]  Fax: [phone]  Follow Up Time: 2 weeks

## 2023-01-12 LAB — SURGICAL PATHOLOGY STUDY: SIGNIFICANT CHANGE UP

## 2023-01-26 ENCOUNTER — APPOINTMENT (OUTPATIENT)
Dept: OBGYN | Facility: CLINIC | Age: 48
End: 2023-01-26
Payer: COMMERCIAL

## 2023-01-26 VITALS
DIASTOLIC BLOOD PRESSURE: 92 MMHG | WEIGHT: 158 LBS | HEART RATE: 116 BPM | HEIGHT: 60 IN | BODY MASS INDEX: 31.02 KG/M2 | SYSTOLIC BLOOD PRESSURE: 136 MMHG

## 2023-01-26 PROCEDURE — 99024 POSTOP FOLLOW-UP VISIT: CPT

## 2023-01-26 NOTE — HISTORY OF PRESENT ILLNESS
[FreeTextEntry1] : 48 yo P2 here for PO check after vNOTES [facility] BS for HMB and dysmenorrhea 1/6/23.\par Pt w/slight lower abd discomfort, and rare vag spotting.\par \par Pt would like to RTW Feb 6th

## 2023-01-26 NOTE — PHYSICAL EXAM
[FreeTextEntry7] : soft, NT, ND [Normal] : normal external genitalia [FreeTextEntry4] : Cuff in tact, minimal TTP

## 2023-01-26 NOTE — DISCUSSION/SUMMARY
[FreeTextEntry1] : 48 yo P2 here for PO check after vNOTES [facility] BS for HMB and dysmenorrhea 1/6/23.\par Pt recovering well.\par Path w/fibroids and adeno.\par \par Plan\par Pt would like to RTW 2/6/23\par RTO for second PO check in 2-4 wks

## 2023-03-16 ENCOUNTER — APPOINTMENT (OUTPATIENT)
Dept: OBGYN | Facility: CLINIC | Age: 48
End: 2023-03-16
Payer: COMMERCIAL

## 2023-03-16 VITALS
HEIGHT: 62 IN | BODY MASS INDEX: 28.16 KG/M2 | SYSTOLIC BLOOD PRESSURE: 135 MMHG | DIASTOLIC BLOOD PRESSURE: 89 MMHG | HEART RATE: 73 BPM | WEIGHT: 153 LBS

## 2023-03-16 DIAGNOSIS — N80.0 ENDOMETRIOSIS OF UTERUS: ICD-10-CM

## 2023-03-16 DIAGNOSIS — D25.9 LEIOMYOMA OF UTERUS, UNSPECIFIED: ICD-10-CM

## 2023-03-16 PROCEDURE — 99024 POSTOP FOLLOW-UP VISIT: CPT

## 2023-03-19 PROBLEM — N80.0 ADENOMYOSIS: Status: ACTIVE | Noted: 2023-01-26

## 2023-03-19 PROBLEM — D25.9 FIBROID, UTERINE: Status: ACTIVE | Noted: 2022-04-07

## 2023-03-19 NOTE — HISTORY OF PRESENT ILLNESS
[FreeTextEntry1] : 48 yo P2 here for second PO check after vNOTES [facility] BS for HMB and dysmenorrhea 1/6/23.\par No major complaints.

## 2023-03-19 NOTE — DISCUSSION/SUMMARY
[FreeTextEntry1] : 46 yo P2 here for second PO check after vNOTES [facility] BS for HMB and dysmenorrhea 1/6/23.\par Pt recovering well.\par Path benign.\par \par Plan\par Routine f/u w/ GYN

## 2023-06-20 ENCOUNTER — TRANSCRIPTION ENCOUNTER (OUTPATIENT)
Age: 48
End: 2023-06-20

## 2023-06-20 ENCOUNTER — INPATIENT (INPATIENT)
Facility: HOSPITAL | Age: 48
LOS: 1 days | Discharge: HOME | End: 2023-06-22
Attending: SURGERY | Admitting: SURGERY
Payer: COMMERCIAL

## 2023-06-20 VITALS
TEMPERATURE: 98 F | DIASTOLIC BLOOD PRESSURE: 62 MMHG | OXYGEN SATURATION: 98 % | RESPIRATION RATE: 18 BRPM | SYSTOLIC BLOOD PRESSURE: 136 MMHG | HEART RATE: 85 BPM

## 2023-06-20 DIAGNOSIS — K80.20 CALCULUS OF GALLBLADDER WITHOUT CHOLECYSTITIS WITHOUT OBSTRUCTION: ICD-10-CM

## 2023-06-20 DIAGNOSIS — Z98.890 OTHER SPECIFIED POSTPROCEDURAL STATES: Chronic | ICD-10-CM

## 2023-06-20 LAB
ALBUMIN SERPL ELPH-MCNC: 4.2 G/DL — SIGNIFICANT CHANGE UP (ref 3.3–5)
ALP SERPL-CCNC: 108 U/L — SIGNIFICANT CHANGE UP (ref 40–120)
ALT FLD-CCNC: 14 U/L — SIGNIFICANT CHANGE UP (ref 4–33)
ANION GAP SERPL CALC-SCNC: 11 MMOL/L — SIGNIFICANT CHANGE UP (ref 7–14)
APPEARANCE UR: ABNORMAL
AST SERPL-CCNC: 16 U/L — SIGNIFICANT CHANGE UP (ref 4–32)
BASOPHILS # BLD AUTO: 0.03 K/UL — SIGNIFICANT CHANGE UP (ref 0–0.2)
BASOPHILS NFR BLD AUTO: 0.3 % — SIGNIFICANT CHANGE UP (ref 0–2)
BILIRUB SERPL-MCNC: 0.2 MG/DL — SIGNIFICANT CHANGE UP (ref 0.2–1.2)
BILIRUB UR-MCNC: NEGATIVE — SIGNIFICANT CHANGE UP
BUN SERPL-MCNC: 9 MG/DL — SIGNIFICANT CHANGE UP (ref 7–23)
CALCIUM SERPL-MCNC: 9.7 MG/DL — SIGNIFICANT CHANGE UP (ref 8.4–10.5)
CHLORIDE SERPL-SCNC: 103 MMOL/L — SIGNIFICANT CHANGE UP (ref 98–107)
CO2 SERPL-SCNC: 23 MMOL/L — SIGNIFICANT CHANGE UP (ref 22–31)
COLOR SPEC: SIGNIFICANT CHANGE UP
CREAT SERPL-MCNC: 0.58 MG/DL — SIGNIFICANT CHANGE UP (ref 0.5–1.3)
DIFF PNL FLD: NEGATIVE — SIGNIFICANT CHANGE UP
EGFR: 112 ML/MIN/1.73M2 — SIGNIFICANT CHANGE UP
EOSINOPHIL # BLD AUTO: 0.11 K/UL — SIGNIFICANT CHANGE UP (ref 0–0.5)
EOSINOPHIL NFR BLD AUTO: 1.1 % — SIGNIFICANT CHANGE UP (ref 0–6)
GLUCOSE SERPL-MCNC: 105 MG/DL — HIGH (ref 70–99)
GLUCOSE UR QL: NEGATIVE — SIGNIFICANT CHANGE UP
HCT VFR BLD CALC: 39.1 % — SIGNIFICANT CHANGE UP (ref 34.5–45)
HGB BLD-MCNC: 12.2 G/DL — SIGNIFICANT CHANGE UP (ref 11.5–15.5)
HIV 1+2 AB+HIV1 P24 AG SERPL QL IA: SIGNIFICANT CHANGE UP
IANC: 6.45 K/UL — SIGNIFICANT CHANGE UP (ref 1.8–7.4)
IMM GRANULOCYTES NFR BLD AUTO: 0.4 % — SIGNIFICANT CHANGE UP (ref 0–0.9)
KETONES UR-MCNC: NEGATIVE — SIGNIFICANT CHANGE UP
LEUKOCYTE ESTERASE UR-ACNC: NEGATIVE — SIGNIFICANT CHANGE UP
LIDOCAIN IGE QN: 27 U/L — SIGNIFICANT CHANGE UP (ref 7–60)
LYMPHOCYTES # BLD AUTO: 3.34 K/UL — HIGH (ref 1–3.3)
LYMPHOCYTES # BLD AUTO: 32.1 % — SIGNIFICANT CHANGE UP (ref 13–44)
MCHC RBC-ENTMCNC: 27.1 PG — SIGNIFICANT CHANGE UP (ref 27–34)
MCHC RBC-ENTMCNC: 31.2 GM/DL — LOW (ref 32–36)
MCV RBC AUTO: 86.9 FL — SIGNIFICANT CHANGE UP (ref 80–100)
MONOCYTES # BLD AUTO: 0.45 K/UL — SIGNIFICANT CHANGE UP (ref 0–0.9)
MONOCYTES NFR BLD AUTO: 4.3 % — SIGNIFICANT CHANGE UP (ref 2–14)
NEUTROPHILS # BLD AUTO: 6.45 K/UL — SIGNIFICANT CHANGE UP (ref 1.8–7.4)
NEUTROPHILS NFR BLD AUTO: 61.8 % — SIGNIFICANT CHANGE UP (ref 43–77)
NITRITE UR-MCNC: NEGATIVE — SIGNIFICANT CHANGE UP
NRBC # BLD: 0 /100 WBCS — SIGNIFICANT CHANGE UP (ref 0–0)
NRBC # FLD: 0 K/UL — SIGNIFICANT CHANGE UP (ref 0–0)
PH UR: 5.5 — SIGNIFICANT CHANGE UP (ref 5–8)
PLATELET # BLD AUTO: 394 K/UL — SIGNIFICANT CHANGE UP (ref 150–400)
POTASSIUM SERPL-MCNC: 4.5 MMOL/L — SIGNIFICANT CHANGE UP (ref 3.5–5.3)
POTASSIUM SERPL-SCNC: 4.5 MMOL/L — SIGNIFICANT CHANGE UP (ref 3.5–5.3)
PROT SERPL-MCNC: 8.1 G/DL — SIGNIFICANT CHANGE UP (ref 6–8.3)
PROT UR-MCNC: ABNORMAL
RBC # BLD: 4.5 M/UL — SIGNIFICANT CHANGE UP (ref 3.8–5.2)
RBC # FLD: 15.9 % — HIGH (ref 10.3–14.5)
SODIUM SERPL-SCNC: 137 MMOL/L — SIGNIFICANT CHANGE UP (ref 135–145)
SP GR SPEC: 1.02 — SIGNIFICANT CHANGE UP (ref 1.01–1.05)
UROBILINOGEN FLD QL: SIGNIFICANT CHANGE UP
WBC # BLD: 10.42 K/UL — SIGNIFICANT CHANGE UP (ref 3.8–10.5)
WBC # FLD AUTO: 10.42 K/UL — SIGNIFICANT CHANGE UP (ref 3.8–10.5)

## 2023-06-20 PROCEDURE — 74177 CT ABD & PELVIS W/CONTRAST: CPT | Mod: 26,MA

## 2023-06-20 PROCEDURE — 99285 EMERGENCY DEPT VISIT HI MDM: CPT

## 2023-06-20 PROCEDURE — 76705 ECHO EXAM OF ABDOMEN: CPT | Mod: 26

## 2023-06-20 RX ORDER — SODIUM CHLORIDE 9 MG/ML
1000 INJECTION, SOLUTION INTRAVENOUS
Refills: 0 | Status: DISCONTINUED | OUTPATIENT
Start: 2023-06-20 | End: 2023-06-21

## 2023-06-20 RX ORDER — ENOXAPARIN SODIUM 100 MG/ML
40 INJECTION SUBCUTANEOUS EVERY 24 HOURS
Refills: 0 | Status: DISCONTINUED | OUTPATIENT
Start: 2023-06-20 | End: 2023-06-21

## 2023-06-20 RX ORDER — SODIUM CHLORIDE 9 MG/ML
1000 INJECTION INTRAMUSCULAR; INTRAVENOUS; SUBCUTANEOUS ONCE
Refills: 0 | Status: COMPLETED | OUTPATIENT
Start: 2023-06-20 | End: 2023-06-20

## 2023-06-20 RX ORDER — SODIUM CHLORIDE 9 MG/ML
1000 INJECTION, SOLUTION INTRAVENOUS
Refills: 0 | Status: DISCONTINUED | OUTPATIENT
Start: 2023-06-20 | End: 2023-06-20

## 2023-06-20 RX ORDER — ACETAMINOPHEN 500 MG
650 TABLET ORAL EVERY 6 HOURS
Refills: 0 | Status: DISCONTINUED | OUTPATIENT
Start: 2023-06-20 | End: 2023-06-21

## 2023-06-20 RX ORDER — MORPHINE SULFATE 50 MG/1
4 CAPSULE, EXTENDED RELEASE ORAL ONCE
Refills: 0 | Status: DISCONTINUED | OUTPATIENT
Start: 2023-06-20 | End: 2023-06-20

## 2023-06-20 RX ORDER — OXYCODONE HYDROCHLORIDE 5 MG/1
5 TABLET ORAL EVERY 4 HOURS
Refills: 0 | Status: DISCONTINUED | OUTPATIENT
Start: 2023-06-20 | End: 2023-06-21

## 2023-06-20 RX ADMIN — OXYCODONE HYDROCHLORIDE 5 MILLIGRAM(S): 5 TABLET ORAL at 22:00

## 2023-06-20 RX ADMIN — SODIUM CHLORIDE 1000 MILLILITER(S): 9 INJECTION INTRAMUSCULAR; INTRAVENOUS; SUBCUTANEOUS at 10:30

## 2023-06-20 RX ADMIN — OXYCODONE HYDROCHLORIDE 5 MILLIGRAM(S): 5 TABLET ORAL at 19:26

## 2023-06-20 RX ADMIN — MORPHINE SULFATE 4 MILLIGRAM(S): 50 CAPSULE, EXTENDED RELEASE ORAL at 10:30

## 2023-06-20 NOTE — ED PROVIDER NOTE - OBJECTIVE STATEMENT
48 yo f s/p TULIO-BSO in jan 2023, cholelithiasis, pw ABD pain.  Patient reports severe abdominal pain, started last night after eating.  Reports diffuse pain worse in right upper quadrant and left lower quadrant.  Denies N/V, CP, SOB, F/C.  Denies urinary symptoms.  Denies vaginal bleeding.  Last BM and flatus last night.  Reports several episodes nonbloody, nonbilious emesis.

## 2023-06-20 NOTE — ED PROVIDER NOTE - PHYSICAL EXAMINATION
General: well appearing, interactive, well nourished, no apparent distress, ncat  HEENT: EOMI, PERRLA, normal mucosa, normal oropharynx, no lesions on the lips or on oral mucosa, normal external ear  Neck: supple, no lymphadenopathy, full range of motion, no nuchal rigidity  CV: RRR, normal S1 and S2 with no murmur, capillary refill less than two seconds  Resp: lungs CTA b/l, good aeration bilaterally, symmetric chest wall   Abd: non-distended, soft, diffuse ttp, no guarding/rebound  : no CVA tenderness  MSK: full range of motion, no cyanosis, no edema, no clubbing, no immobility  Neuro: CN II-XII grossly intact, muscle strength 5/5 in all extremities, normal gait  Skin: no rashes, skin intact

## 2023-06-20 NOTE — ED ADULT NURSE NOTE - OBJECTIVE STATEMENT
Pt a&ox3 c/o abdominal pain radiating to Lt lower quadrant, h/o gallstones scheduled for surgery next but pain has been persistent, breathing even and unlabored, no present n/v, no dysuria/hematuria, afebrile, ivl placed, labs collected and sent, medicated as ordered.

## 2023-06-20 NOTE — ED PROVIDER NOTE - PROGRESS NOTE DETAILS
Greg Snyder DO: Patient reassessed, NAD, non-toxic appearing. results reviewed. accepted to surgery service.

## 2023-06-20 NOTE — ED ADULT TRIAGE NOTE - CHIEF COMPLAINT QUOTE
p.t with hx  gallstones scheduled for sx next week, c/o of abd pain and unable to tolerate any food for past few days

## 2023-06-20 NOTE — H&P ADULT - NSHPPHYSICALEXAM_GEN_ALL_CORE
Gen: NAD  CV: Regular rate  Resp: Nonlabored breathing on room air  Abd: Soft, nondistended, diffusely has mild tenderness to palpation with severe RUQ tenderness to palpation  Ext: Warm

## 2023-06-20 NOTE — ED PROVIDER NOTE - CLINICAL SUMMARY MEDICAL DECISION MAKING FREE TEXT BOX
Greg Snyder, DO: 46 yo f s/p TULIO-BSO in jan 2023, cholelithiasis, pw ABD pain.  Patient reports severe abdominal pain, started last night after eating.  Reports diffuse pain worse in right upper quadrant and left lower quadrant.  Denies N/V, CP, SOB, F/C.  Denies urinary symptoms.  Denies vaginal bleeding.  Last BM and flatus last night.  Reports several episodes nonbloody, nonbilious emesis. Patient arrives HDS, well-appearing, PE as noted.  Will evaluate for SBO, cholecystitis, reassess.

## 2023-06-20 NOTE — H&P ADULT - ASSESSMENT
47 year old female with symptomatic cholelithiasis.    Plan:  - Admit to A Team Surgery, Dr. Estrada  - Regular diet today, NPO after midnight  - IV fluids after midnight  - Pain control    Discussed with attending surgeon, Dr. Estrada.      A Team Surgery  [id number] 47 year old female with symptomatic cholelithiasis.    Plan:  - Admit to A Team Surgery, Dr. Estrada  - Regular diet today, NPO after midnight  - IV fluids after midnight  - Pain control  - Will add on and consent for laparoscopic cholecystectomy, possible open tomorrow    Discussed with attending surgeon, Dr. Estrada.      A Team Surgery  [id number]

## 2023-06-20 NOTE — H&P ADULT - HISTORY OF PRESENT ILLNESS
Patient is a 47 year old female with no significant PMHx presenting with abdominal pain located in the epigastric region that started last night. Associated nausea, vomiting, and chills. Denies diarrhea and fevers. Has had intermittent episodes of pain for the last 1-2 weeks with resolution of symptoms after a few hours. Had an US with Dr. Dc and was found to have gallstones. Was scheduled to have cholecystectomy next week. Due to pain, presented to ED.

## 2023-06-20 NOTE — H&P ADULT - NSHPLABSRESULTS_GEN_ALL_CORE
12.2   10.42 )-----------( 394      ( 20 Jun 2023 10:25 )             39.1     06-20    137  |  103  |  9   ----------------------------<  105<H>  4.5   |  23  |  0.58    Ca    9.7      20 Jun 2023 10:25    TPro  8.1  /  Alb  4.2  /  TBili  0.2  /  DBili  x   /  AST  16  /  ALT  14  /  AlkPhos  108  06-20        IMAGING:  < from: US Abdomen Upper Quadrant Right (06.20.23 @ 10:58) >      ACC: [id number] EXAM:  US ABDOMEN RT UPR QUADRANT   ORDERED BY: HERBERT FORD     PROCEDURE DATE:  06/20/2023          INTERPRETATION:  CLINICAL INFORMATION: Abdominal pain. Evaluate for   cholecystitis. Nausea and vomiting.    COMPARISON: None available.    TECHNIQUE: Sonography of the right upper quadrant.    FINDINGS:  Liver: Increased echogenicity.  Bile ducts: Normal caliber. Common bile duct measures 3 mm.  Gallbladder: Wall echo shadow sign at the fundus, likely due to a large   gallstone measuring 2.8 cm. No gallbladder wall thickening or   pericholecystic fluid. Negative sonographic Suazo's sign.  Pancreas: Increased echogenicity.  Right kidney: 9.7 cm. No hydronephrosis.  Ascites: None.  IVC: Visualized portions are within normal limits.    IMPRESSION:  Cholelithiasis without sonographic evidence of acute cholecystitis.    Increased hepatic echogenicity, may be seen in setting of steatosis.    Increased pancreatic echogenicity, nonspecific, may be seen in the   setting of fatty replacement or also with acute or chronic pancreatitis.   Correlate clinically.    --- End of Report ---          NAYELI VICENTE MD; Resident Radiologist  This document has been electronically signed.  IVANA DELUNA MD; Attending Radiologist  This document has been electronically signed. Jun 20 2023 11:40AM    < end of copied text >      < from: CT Abdomen and Pelvis w/ IV Cont (06.20.23 @ 11:48) >    IMPRESSION:  *  No evidence of acute intra-abdominal pathology.  *  A 2.5 cm gallstone. No evidence of acute cholecystitis.

## 2023-06-20 NOTE — ED ADULT NURSE NOTE - NSFALLUNIVINTERV_ED_ALL_ED
Bed/Stretcher in lowest position, wheels locked, appropriate side rails in place/Call bell, personal items and telephone in reach/Instruct patient to call for assistance before getting out of bed/chair/stretcher/Non-slip footwear applied when patient is off stretcher/[place] to call system/Physically safe environment - no spills, clutter or unnecessary equipment/Purposeful proactive rounding/Room/bathroom lighting operational, light cord in reach

## 2023-06-21 ENCOUNTER — TRANSCRIPTION ENCOUNTER (OUTPATIENT)
Age: 48
End: 2023-06-21

## 2023-06-21 LAB
ALBUMIN SERPL ELPH-MCNC: 3.4 G/DL — SIGNIFICANT CHANGE UP (ref 3.3–5)
ALP SERPL-CCNC: 87 U/L — SIGNIFICANT CHANGE UP (ref 40–120)
ALT FLD-CCNC: 11 U/L — SIGNIFICANT CHANGE UP (ref 4–33)
ANION GAP SERPL CALC-SCNC: 14 MMOL/L — SIGNIFICANT CHANGE UP (ref 7–14)
APTT BLD: 32.9 SEC — SIGNIFICANT CHANGE UP (ref 27–36.3)
AST SERPL-CCNC: 13 U/L — SIGNIFICANT CHANGE UP (ref 4–32)
BILIRUB SERPL-MCNC: <0.2 MG/DL — SIGNIFICANT CHANGE UP (ref 0.2–1.2)
BLD GP AB SCN SERPL QL: NEGATIVE — SIGNIFICANT CHANGE UP
BUN SERPL-MCNC: 14 MG/DL — SIGNIFICANT CHANGE UP (ref 7–23)
CALCIUM SERPL-MCNC: 9.2 MG/DL — SIGNIFICANT CHANGE UP (ref 8.4–10.5)
CHLORIDE SERPL-SCNC: 101 MMOL/L — SIGNIFICANT CHANGE UP (ref 98–107)
CO2 SERPL-SCNC: 23 MMOL/L — SIGNIFICANT CHANGE UP (ref 22–31)
CREAT SERPL-MCNC: 0.64 MG/DL — SIGNIFICANT CHANGE UP (ref 0.5–1.3)
CULTURE RESULTS: SIGNIFICANT CHANGE UP
EGFR: 109 ML/MIN/1.73M2 — SIGNIFICANT CHANGE UP
GLUCOSE BLDC GLUCOMTR-MCNC: 92 MG/DL — SIGNIFICANT CHANGE UP (ref 70–99)
GLUCOSE SERPL-MCNC: 95 MG/DL — SIGNIFICANT CHANGE UP (ref 70–99)
HCG SERPL-ACNC: <1 MIU/ML — SIGNIFICANT CHANGE UP
HCT VFR BLD CALC: 33.6 % — LOW (ref 34.5–45)
HGB BLD-MCNC: 10.6 G/DL — LOW (ref 11.5–15.5)
INR BLD: 1.2 RATIO — HIGH (ref 0.88–1.16)
MAGNESIUM SERPL-MCNC: 1.9 MG/DL — SIGNIFICANT CHANGE UP (ref 1.6–2.6)
MCHC RBC-ENTMCNC: 28 PG — SIGNIFICANT CHANGE UP (ref 27–34)
MCHC RBC-ENTMCNC: 31.5 GM/DL — LOW (ref 32–36)
MCV RBC AUTO: 88.9 FL — SIGNIFICANT CHANGE UP (ref 80–100)
NRBC # BLD: 0 /100 WBCS — SIGNIFICANT CHANGE UP (ref 0–0)
NRBC # FLD: 0 K/UL — SIGNIFICANT CHANGE UP (ref 0–0)
PHOSPHATE SERPL-MCNC: 3.8 MG/DL — SIGNIFICANT CHANGE UP (ref 2.5–4.5)
PLATELET # BLD AUTO: 342 K/UL — SIGNIFICANT CHANGE UP (ref 150–400)
POTASSIUM SERPL-MCNC: 4.5 MMOL/L — SIGNIFICANT CHANGE UP (ref 3.5–5.3)
POTASSIUM SERPL-SCNC: 4.5 MMOL/L — SIGNIFICANT CHANGE UP (ref 3.5–5.3)
PROT SERPL-MCNC: 6.6 G/DL — SIGNIFICANT CHANGE UP (ref 6–8.3)
PROTHROM AB SERPL-ACNC: 14 SEC — HIGH (ref 10.5–13.4)
RBC # BLD: 3.78 M/UL — LOW (ref 3.8–5.2)
RBC # FLD: 16.1 % — HIGH (ref 10.3–14.5)
RH IG SCN BLD-IMP: POSITIVE — SIGNIFICANT CHANGE UP
SODIUM SERPL-SCNC: 138 MMOL/L — SIGNIFICANT CHANGE UP (ref 135–145)
SPECIMEN SOURCE: SIGNIFICANT CHANGE UP
WBC # BLD: 10.41 K/UL — SIGNIFICANT CHANGE UP (ref 3.8–10.5)
WBC # FLD AUTO: 10.41 K/UL — SIGNIFICANT CHANGE UP (ref 3.8–10.5)

## 2023-06-21 PROCEDURE — 88304 TISSUE EXAM BY PATHOLOGIST: CPT | Mod: 26

## 2023-06-21 DEVICE — CLIP APPLIER COVIDIEN ENDOCLIP II 10MM MED/LG: Type: IMPLANTABLE DEVICE | Status: FUNCTIONAL

## 2023-06-21 DEVICE — SURGICEL NU-KNIT 6 X 9": Type: IMPLANTABLE DEVICE | Status: FUNCTIONAL

## 2023-06-21 RX ORDER — SODIUM CHLORIDE 9 MG/ML
1000 INJECTION, SOLUTION INTRAVENOUS
Refills: 0 | Status: DISCONTINUED | OUTPATIENT
Start: 2023-06-21 | End: 2023-06-22

## 2023-06-21 RX ORDER — SIMETHICONE 80 MG/1
80 TABLET, CHEWABLE ORAL ONCE
Refills: 0 | Status: COMPLETED | OUTPATIENT
Start: 2023-06-21 | End: 2023-06-21

## 2023-06-21 RX ORDER — FENTANYL CITRATE 50 UG/ML
25 INJECTION INTRAVENOUS
Refills: 0 | Status: DISCONTINUED | OUTPATIENT
Start: 2023-06-21 | End: 2023-06-21

## 2023-06-21 RX ORDER — ACETAMINOPHEN 500 MG
2 TABLET ORAL
Qty: 0 | Refills: 0 | DISCHARGE
Start: 2023-06-21

## 2023-06-21 RX ORDER — ACETAMINOPHEN 500 MG
975 TABLET ORAL EVERY 6 HOURS
Refills: 0 | Status: DISCONTINUED | OUTPATIENT
Start: 2023-06-21 | End: 2023-06-22

## 2023-06-21 RX ORDER — OXYCODONE HYDROCHLORIDE 5 MG/1
10 TABLET ORAL EVERY 4 HOURS
Refills: 0 | Status: DISCONTINUED | OUTPATIENT
Start: 2023-06-21 | End: 2023-06-22

## 2023-06-21 RX ORDER — SODIUM CHLORIDE 9 MG/ML
1000 INJECTION, SOLUTION INTRAVENOUS
Refills: 0 | Status: DISCONTINUED | OUTPATIENT
Start: 2023-06-21 | End: 2023-06-21

## 2023-06-21 RX ORDER — HYDROMORPHONE HYDROCHLORIDE 2 MG/ML
0.5 INJECTION INTRAMUSCULAR; INTRAVENOUS; SUBCUTANEOUS
Refills: 0 | Status: DISCONTINUED | OUTPATIENT
Start: 2023-06-21 | End: 2023-06-21

## 2023-06-21 RX ORDER — OXYCODONE HYDROCHLORIDE 5 MG/1
5 TABLET ORAL EVERY 4 HOURS
Refills: 0 | Status: DISCONTINUED | OUTPATIENT
Start: 2023-06-21 | End: 2023-06-22

## 2023-06-21 RX ORDER — PIPERACILLIN AND TAZOBACTAM 4; .5 G/20ML; G/20ML
3.38 INJECTION, POWDER, LYOPHILIZED, FOR SOLUTION INTRAVENOUS ONCE
Refills: 0 | Status: COMPLETED | OUTPATIENT
Start: 2023-06-21 | End: 2023-06-21

## 2023-06-21 RX ORDER — PIPERACILLIN AND TAZOBACTAM 4; .5 G/20ML; G/20ML
3.38 INJECTION, POWDER, LYOPHILIZED, FOR SOLUTION INTRAVENOUS EVERY 8 HOURS
Refills: 0 | Status: DISCONTINUED | OUTPATIENT
Start: 2023-06-22 | End: 2023-06-22

## 2023-06-21 RX ADMIN — Medication 975 MILLIGRAM(S): at 23:22

## 2023-06-21 RX ADMIN — HYDROMORPHONE HYDROCHLORIDE 0.5 MILLIGRAM(S): 2 INJECTION INTRAMUSCULAR; INTRAVENOUS; SUBCUTANEOUS at 18:55

## 2023-06-21 RX ADMIN — OXYCODONE HYDROCHLORIDE 10 MILLIGRAM(S): 5 TABLET ORAL at 23:04

## 2023-06-21 RX ADMIN — HYDROMORPHONE HYDROCHLORIDE 0.5 MILLIGRAM(S): 2 INJECTION INTRAMUSCULAR; INTRAVENOUS; SUBCUTANEOUS at 18:11

## 2023-06-21 RX ADMIN — PIPERACILLIN AND TAZOBACTAM 25 GRAM(S): 4; .5 INJECTION, POWDER, LYOPHILIZED, FOR SOLUTION INTRAVENOUS at 23:05

## 2023-06-21 RX ADMIN — Medication 975 MILLIGRAM(S): at 23:04

## 2023-06-21 RX ADMIN — HYDROMORPHONE HYDROCHLORIDE 0.5 MILLIGRAM(S): 2 INJECTION INTRAMUSCULAR; INTRAVENOUS; SUBCUTANEOUS at 19:00

## 2023-06-21 RX ADMIN — PIPERACILLIN AND TAZOBACTAM 200 GRAM(S): 4; .5 INJECTION, POWDER, LYOPHILIZED, FOR SOLUTION INTRAVENOUS at 20:45

## 2023-06-21 RX ADMIN — OXYCODONE HYDROCHLORIDE 10 MILLIGRAM(S): 5 TABLET ORAL at 23:22

## 2023-06-21 RX ADMIN — SIMETHICONE 80 MILLIGRAM(S): 80 TABLET, CHEWABLE ORAL at 20:05

## 2023-06-21 RX ADMIN — SODIUM CHLORIDE 125 MILLILITER(S): 9 INJECTION, SOLUTION INTRAVENOUS at 20:52

## 2023-06-21 RX ADMIN — HYDROMORPHONE HYDROCHLORIDE 0.5 MILLIGRAM(S): 2 INJECTION INTRAMUSCULAR; INTRAVENOUS; SUBCUTANEOUS at 18:20

## 2023-06-21 NOTE — PROGRESS NOTE ADULT - ASSESSMENT
ASSESSMENT:  48 y/o female with PMHx asthma and migraines presented with abdominal pain, found to have cholelithiasis. Patient pending OR.     PLAN:  - Diet: NPO for OR/IVF  - f/u AM labs, replete prn  - pain control prn  - OOB/ambulate as tolerated  - DVT ppx: Lovenox  - dispo: possible home after OR today     A Team  [id number]

## 2023-06-21 NOTE — DISCHARGE NOTE PROVIDER - HOSPITAL COURSE
Patient is a 46 y/o female with PMHx of asthma and migraines who presented to [facility] with abdominal pain.     RUQ ultrasound on admission showed: Cholelithiasis without sonographic evidence of acute cholecystitis. Increased hepatic echogenicity, may be seen in setting of steatosis. Increased pancreatic echogenicity, nonspecific, may be seen in the setting of fatty replacement or also with acute or chronic pancreatitis.     CT on admission showed: No evidence of acute intra-abdominal pathology. A 2.5 cm gallstone. No evidence of acute cholecystitis.    Patient admitted to surgical service for management. Patient is now s/p laparoscopic cholecystectomy on 6/21/23. Patient tolerated operation well and there were no post-operative complications identified. Patient remained hemodynamically stable in the PACU and transferred to the surgical floor.  **************************  At this time, patient is currently ambulating, voiding, tolerating a regular diet. Pain well controlled on PO pain meds. Patient is hemodynamically stable and felt safe with being discharged. Patient understood and agreed with plan. Per Dr. Estrada, patient is stable for discharge to home with outpatient follow up within 1-2 weeks of discharge. Patient is a 48 y/o female with PMHx of asthma and migraines who presented to [facility] with abdominal pain.     RUQ ultrasound on admission showed: Cholelithiasis without sonographic evidence of acute cholecystitis. Increased hepatic echogenicity, may be seen in setting of steatosis. Increased pancreatic echogenicity, nonspecific, may be seen in the setting of fatty replacement or also with acute or chronic pancreatitis.     CT on admission showed: No evidence of acute intra-abdominal pathology. A 2.5 cm gallstone. No evidence of acute cholecystitis.    Patient admitted to surgical service for management. Patient is now s/p laparoscopic cholecystectomy on 6/21/23. Patient tolerated operation well and there were no post-operative complications identified. Patient remained hemodynamically stable in the PACU and transferred to the surgical floor.    POD 1 6/22 Patient tolerating diet and pain well-controlled.     At this time, patient is currently ambulating, voiding, tolerating a regular diet. Pain well controlled on PO pain meds. Patient is hemodynamically stable and felt safe with being discharged. Patient understood and agreed with plan. Per Dr. Estrada, patient is stable for discharge to home with PO antibiotics with outpatient follow up within 1-2 weeks of discharge.

## 2023-06-21 NOTE — DISCHARGE NOTE PROVIDER - NSDCFUADDINST_GEN_ALL_CORE_FT
WOUND CARE:  Please keep incisions clean and dry. Please do not Scrub or rub incisions. Do not use lotion or powder on incisions.   BATHING: You may shower and/or sponge bathe. You may use warm soapy water in the shower and rinse, pat dry.   PAIN: You may take over-the-counter medications for pain. You make take Tylenol orally every 6 hours as needed. Do not excessed 4,000mg a day. You may take ibuprofen every 6 hours. You may alternate between the two for better pain control. You have been prescribed narcotics for pain management. Please take as prescribed on pill bottle, AS NEEDED, for BREAKTHROUGH pain ONLY. If you are taking narcotic pain medication DO NOT drive a car, operate machinery or make important decisions.  ACTIVITY: No heavy lifting or straining. Otherwise, you may return to your usual level of physical activity.   DIET: Please consume low fat diet.     NOTIFY YOUR SURGEON IF YOU HAVE: any bleeding that does not stop, any pus draining from your wound(s), any fever (over 100.4 F) persistent nausea/vomiting, inability to urinate, or if your pain is not controlled on your discharge pain medications.     FOLLOW UP:  1. Please follow up with your primary care physician in one week regarding your hospitalization, bring copies of your discharge paperwork.  2. Please follow up with your surgeon, Dr. Estrada 1-2 weeks after discharge. Please call the office to schedule the appointment or if you have any questions. WOUND CARE:  Please keep incisions clean and dry. Please do not Scrub or rub incisions. Do not use lotion or powder on incisions.   BATHING: You may shower and/or sponge bathe. You may use warm soapy water in the shower and rinse, pat dry.   PAIN: You may take over-the-counter medications for pain. You make take Tylenol orally every 6 hours as needed. Do not excessed 4,000mg a day. You have been prescribed narcotics for pain management. Please take as prescribed on pill bottle, AS NEEDED, for BREAKTHROUGH pain ONLY. If you are taking narcotic pain medication DO NOT drive a car, operate machinery or make important decisions.  ANTIBIOTICS: You have been prescribed 5 days total of Augmentin. Please complete course as prescribed on your pill bottle.   ACTIVITY: No heavy lifting or straining. Otherwise, you may return to your usual level of physical activity.   DIET: Please consume low fat diet.     NOTIFY YOUR SURGEON IF YOU HAVE: any bleeding that does not stop, any pus draining from your wound(s), any fever (over 100.4 F) persistent nausea/vomiting, inability to urinate, or if your pain is not controlled on your discharge pain medications.     FOLLOW UP:  1. Please follow up with your primary care physician in one week regarding your hospitalization, bring copies of your discharge paperwork.  2. Please follow up with your surgeon, Dr. Estrada 1-2 weeks after discharge. Please call the office to schedule the appointment or if you have any questions.

## 2023-06-21 NOTE — DISCHARGE NOTE PROVIDER - NSDCFUSCHEDAPPT_GEN_ALL_CORE_FT
[facility]  TOBIAS AFUSTIN  Scheduled Appointment: 06/23/2023    [facility]  [facility] [place] [facility]  Scheduled Appointment: 07/03/2023

## 2023-06-21 NOTE — DISCHARGE NOTE PROVIDER - CARE PROVIDER_API CALL
Jean Estrada  Surgery  [address] [place] [address]  Phone: [phone]  Fax: [phone]  Follow Up Time: 1 week

## 2023-06-21 NOTE — PRE-OP CHECKLIST - WEIGHT IN KG
Requesting refills.  Current Outpatient Medications   Medication Sig Dispense Refill   • donepezil (Aricept) 10 MG tablet Take 1 tablet by mouth nightly. 90 tablet 1   • metFORMIN (GLUCOPHAGE) 500 MG tablet Take 1 tablet by mouth 2 times daily (with meals). 180 tablet 1   • HYDROcodone-acetaminophen (NORCO) 5-325 MG per tablet Take 1 tablet by mouth every 12 hours. 20 tablet 0   • hydrochlorothiazide (HYDRODIURIL) 12.5 MG tablet Take 1 tablet by mouth daily. 90 tablet 0   • amLODIPine (NORVASC) 5 MG tablet Take 1 tablet by mouth daily. 90 tablet 0   • metoPROLOL succinate (TOPROL-XL) 100 MG 24 hr tablet Take 1 tablet by mouth daily. 90 tablet 0   • pravastatin (PRAVACHOL) 40 MG tablet Take 1 tablet by mouth daily. 90 tablet 0   • acetaminophen (Acetaminophen 8 Hour) 650 MG CR tablet Take 650 mg by mouth daily.     • celecoxib (CeleBREX) 100 MG capsule Take 1 capsule by mouth daily. 90 capsule 1   • naLOXone (NARCAN) 4 MG/0.1ML nasal spray Spray the content of 1 device into 1 nostril. Call 911. May repeat with 2nd device in alternate nostril if no response in 2-3 minutes. 2 each 1   • aspirin 325 MG tablet Take 1 tablet by mouth daily. 90 tablet 1   • latanoprost (XALATAN) 0.005 % ophthalmic solution Place 1 drop into both eyes nightly.      • dorzolamide-timolol (COSOPT) 22.3-6.8 MG/ML ophthalmic solution Place 1 drop into left eye 2 times daily.        No current facility-administered medications for this visit.        69

## 2023-06-21 NOTE — DISCHARGE NOTE PROVIDER - NSDCMRMEDTOKEN_GEN_ALL_CORE_FT
acetaminophen 325 mg oral tablet: 2 tab(s) orally every 6 hours As needed Mild Pain (1 - 3), Moderate Pain (4 - 6)   acetaminophen 325 mg oral tablet: 2 tab(s) orally every 6 hours As needed Mild Pain (1 - 3), Moderate Pain (4 - 6)  oxyCODONE 5 mg oral tablet: 1 tab(s) orally every 6 hours as needed for  severe pain MDD: 4   acetaminophen 325 mg oral tablet: 2 tab(s) orally every 6 hours As needed Mild Pain (1 - 3), Moderate Pain (4 - 6)  amoxicillin-clavulanate 875 mg-125 mg oral tablet: 1 tab(s) orally every 12 hours MDD: 2  oxyCODONE 5 mg oral tablet: 1 tab(s) orally every 6 hours as needed for  severe pain MDD: 4

## 2023-06-21 NOTE — CHART NOTE - NSCHARTNOTEFT_GEN_A_CORE
Post Operative Check    Patient is post op from a Laparoscopic cholecystectomy and umbilical hernia repair, recovering appropriately. Patient reports incisional pain. Denies nausea, vomiting, chest pain, SOB. Tolerating diet, urinating independently.    Vitals    T(C): 37 (06-21-23 @ 19:00), Max: 37.3 (06-21-23 @ 13:56)  HR: 65 (06-21-23 @ 20:15) (58 - 85)  BP: 116/64 (06-21-23 @ 20:15) (104/64 - 126/66)  RR: 17 (06-21-23 @ 20:15) (14 - 20)  SpO2: 97% (06-21-23 @ 20:15) (95% - 100%)      06-20 @ 07:01  -  06-21 @ 07:00  --------------------------------------------------------  IN:    Lactated Ringers: 700 mL    Oral Fluid: 280 mL  Total IN: 980 mL    OUT:    Voided (mL): 0 mL  Total OUT: 0 mL    Total NET: 980 mL      06-21 @ 07:01  -  06-21 @ 22:14  --------------------------------------------------------  IN:    Lactated Ringers: 800 mL    Lactated Ringers: 500 mL    Oral Fluid: 240 mL  Total IN: 1540 mL    OUT:    Voided (mL): 300 mL  Total OUT: 300 mL    Total NET: 1240 mL          Labs                        10.6   10.41 )-----------( 342      ( 21 Jun 2023 05:35 )             33.6       CBC Full  -  ( 21 Jun 2023 05:35 )  WBC Count : 10.41 K/uL  Hemoglobin : 10.6 g/dL  Hematocrit : 33.6 %  Platelet Count - Automated : 342 K/uL  Mean Cell Volume : 88.9 fL  Mean Cell Hemoglobin : 28.0 pg  Mean Cell Hemoglobin Concentration : 31.5 gm/dL  Auto Neutrophil # : x  Auto Lymphocyte # : x  Auto Monocyte # : x  Auto Eosinophil # : x  Auto Basophil # : x  Auto Neutrophil % : x  Auto Lymphocyte % : x  Auto Monocyte % : x  Auto Eosinophil % : x  Auto Basophil % : x      Physical Exam  General: NAD, resting comfortably in bed  Resp: unlabored breathing  Cardio: regular rate  Abdomen: soft, appropriately tender, mildly distended, surgical dressings intact      Patient is a 47y old Female s/p lap trudy and umbilical hernia repair, recovering appropriately.    Plan:  - regular diet  - IVF  - continue zosyn  - pain control  - monitor vitals/UOP  - f/u AM labs    A Team Surgery  [id number]

## 2023-06-21 NOTE — PROGRESS NOTE ADULT - SUBJECTIVE AND OBJECTIVE BOX
TEAM [ A ] Surgery Daily Progress Note  =====================================================    SUBJECTIVE: Patient seen and examined at bedside on AM rounds. ___ overnight events. Patient reports that they're feeling well.  Flatus/    BM. OOB/Amublating as tolerated. Denies nausea, vomiting, fever, chills, SOB, chest pain. ******************************    PAST MEDICAL & SURGICAL HISTORY:  Migraine  Asthma  last inhaler use  2005  Meniscus tear  right knee pain since April 2017  S/P left knee arthroscopy  8/1/16  H/O arthroscopy of right knee        ALLERGIES:  aspirin (Swelling; Angioedema)      --------------------------------------------------------------------------------------    MEDICATIONS:    Neurologic Medications  acetaminophen     Tablet .. 650 milliGRAM(s) Oral every 6 hours PRN Mild Pain (1 - 3), Moderate Pain (4 - 6)  oxyCODONE    IR 5 milliGRAM(s) Oral every 4 hours PRN Severe Pain (7 - 10)    Respiratory Medications    Cardiovascular Medications    Gastrointestinal Medications  lactated ringers. 1000 milliLiter(s) IV Continuous <Continuous>    Genitourinary Medications    Hematologic/Oncologic Medications  enoxaparin Injectable 40 milliGRAM(s) SubCutaneous every 24 hours    Antimicrobial/Immunologic Medications    Endocrine/Metabolic Medications    Topical/Other Medications    --------------------------------------------------------------------------------------    VITAL SIGNS:  T(C): 36.9 (06-21-23 @ 02:22), Max: 36.9 (06-21-23 @ 02:22)  HR: 63 (06-21-23 @ 02:22) (55 - 85)  BP: 104/64 (06-21-23 @ 02:22) (98/60 - 136/62)  RR: 18 (06-21-23 @ 02:22) (17 - 18)  SpO2: 98% (06-21-23 @ 02:22) (98% - 100%)  --------------------------------------------------------------------------------------    EXAM  General: NAD, resting in bed comfortably. A&Ox4.   Cardiac: S1, S2. pulse present   Respiratory: Nonlabored respirations, normal cw expansion. No signs of respiratory distress.   Abdomen: soft, nontender, nondistended. ******************** ___ incision is c/d/i, ostomy, NGT, juarez.   Extremities: no deformities, moving all extremities spontaneously.     --------------------------------------------------------------------------------------    LABS                          12.2   10.42 )-----------( 394      ( 20 Jun 2023 10:25 )             39.1     06-20    137  |  103  |  9   ----------------------------<  105<H>  4.5   |  23  |  0.58    Ca    9.7      20 Jun 2023 10:25    TPro  8.1  /  Alb  4.2  /  TBili  0.2  /  DBili  x   /  AST  16  /  ALT  14  /  AlkPhos  108  06-20        --------------------------------------------------------------------------------------    INS AND OUTS:    06-20-23 @ 07:01  -  06-21-23 @ 05:51  --------------------------------------------------------  IN: 580 mL / OUT: 0 mL / NET: 580 mL      --------------------------------------------------------------------------------------           TEAM [ A ] Surgery Daily Progress Note  =====================================================    SUBJECTIVE: Patient seen and examined at bedside on AM rounds. No overnight events. Patient reports that they're feeling well, still has RUQ pain. OOB/Amublating as tolerated. Denies nausea, vomiting, fever, chills, SOB, chest pain.     PAST MEDICAL & SURGICAL HISTORY:  Migraine  Asthma  last inhaler use  2005  Meniscus tear  right knee pain since April 2017  S/P left knee arthroscopy  8/1/16  H/O arthroscopy of right knee        ALLERGIES:  aspirin (Swelling; Angioedema)      --------------------------------------------------------------------------------------    MEDICATIONS:    Neurologic Medications  acetaminophen     Tablet .. 650 milliGRAM(s) Oral every 6 hours PRN Mild Pain (1 - 3), Moderate Pain (4 - 6)  oxyCODONE    IR 5 milliGRAM(s) Oral every 4 hours PRN Severe Pain (7 - 10)    Respiratory Medications    Cardiovascular Medications    Gastrointestinal Medications  lactated ringers. 1000 milliLiter(s) IV Continuous <Continuous>    Genitourinary Medications    Hematologic/Oncologic Medications  enoxaparin Injectable 40 milliGRAM(s) SubCutaneous every 24 hours    Antimicrobial/Immunologic Medications    Endocrine/Metabolic Medications    Topical/Other Medications    --------------------------------------------------------------------------------------    VITAL SIGNS:  T(C): 36.9 (06-21-23 @ 02:22), Max: 36.9 (06-21-23 @ 02:22)  HR: 63 (06-21-23 @ 02:22) (55 - 85)  BP: 104/64 (06-21-23 @ 02:22) (98/60 - 136/62)  RR: 18 (06-21-23 @ 02:22) (17 - 18)  SpO2: 98% (06-21-23 @ 02:22) (98% - 100%)  --------------------------------------------------------------------------------------    EXAM  General: NAD, resting in bed comfortably. A&Ox4.   Cardiac: S1, S2. pulse present   Respiratory: Nonlabored respirations, normal cw expansion. No signs of respiratory distress.   Abdomen: soft, nondistended, RUQ tenderness/epigastric tenderness.   Extremities: no deformities, moving all extremities spontaneously.     --------------------------------------------------------------------------------------    LABS                          12.2   10.42 )-----------( 394      ( 20 Jun 2023 10:25 )             39.1     06-20    137  |  103  |  9   ----------------------------<  105<H>  4.5   |  23  |  0.58    Ca    9.7      20 Jun 2023 10:25    TPro  8.1  /  Alb  4.2  /  TBili  0.2  /  DBili  x   /  AST  16  /  ALT  14  /  AlkPhos  108  06-20        --------------------------------------------------------------------------------------    INS AND OUTS:    06-20-23 @ 07:01  -  06-21-23 @ 05:51  --------------------------------------------------------  IN: 580 mL / OUT: 0 mL / NET: 580 mL      --------------------------------------------------------------------------------------

## 2023-06-21 NOTE — BRIEF OPERATIVE NOTE - OPERATION/FINDINGS
Laparoscopic cholecystectomy, some bleeding from cystic artery branch, 5mm clips used for hemostasis, cystic artery and vein [id number] as well with 5mm clips. Umbilical hernia repaired primary with prolene suture. Laparoscopic cholecystectomy, some bleeding from cystic artery branch, 5mm clips used for hemostasis, cystic artery and vein [id number] as well with 5mm clips. nu-knit placed in gallbladder fossa. Umbilical hernia repaired primarily with prolene suture. Port sites closed with monocryl.

## 2023-06-21 NOTE — BRIEF OPERATIVE NOTE - NSICDXBRIEFPOSTOP_GEN_ALL_CORE_FT
Alert-The patient is alert, awake and responds to voice. The patient is oriented to time, place, and person. The triage nurse is able to obtain subjective information.
POST-OP DIAGNOSIS:  Acute cholecystitis 21-Jun-2023 17:52:45  Isaac Hughes  
Patient/Caregiver provided printed discharge information.

## 2023-06-22 ENCOUNTER — TRANSCRIPTION ENCOUNTER (OUTPATIENT)
Age: 48
End: 2023-06-22

## 2023-06-22 VITALS
TEMPERATURE: 98 F | HEART RATE: 68 BPM | RESPIRATION RATE: 18 BRPM | OXYGEN SATURATION: 98 % | DIASTOLIC BLOOD PRESSURE: 54 MMHG | SYSTOLIC BLOOD PRESSURE: 103 MMHG

## 2023-06-22 LAB
ALBUMIN SERPL ELPH-MCNC: 3.7 G/DL — SIGNIFICANT CHANGE UP (ref 3.3–5)
ALP SERPL-CCNC: 87 U/L — SIGNIFICANT CHANGE UP (ref 40–120)
ALT FLD-CCNC: 29 U/L — SIGNIFICANT CHANGE UP (ref 4–33)
ANION GAP SERPL CALC-SCNC: 14 MMOL/L — SIGNIFICANT CHANGE UP (ref 7–14)
AST SERPL-CCNC: 30 U/L — SIGNIFICANT CHANGE UP (ref 4–32)
BILIRUB SERPL-MCNC: 0.2 MG/DL — SIGNIFICANT CHANGE UP (ref 0.2–1.2)
BUN SERPL-MCNC: 7 MG/DL — SIGNIFICANT CHANGE UP (ref 7–23)
CALCIUM SERPL-MCNC: 9.5 MG/DL — SIGNIFICANT CHANGE UP (ref 8.4–10.5)
CHLORIDE SERPL-SCNC: 102 MMOL/L — SIGNIFICANT CHANGE UP (ref 98–107)
CO2 SERPL-SCNC: 20 MMOL/L — LOW (ref 22–31)
CREAT SERPL-MCNC: 0.6 MG/DL — SIGNIFICANT CHANGE UP (ref 0.5–1.3)
EGFR: 111 ML/MIN/1.73M2 — SIGNIFICANT CHANGE UP
GLUCOSE SERPL-MCNC: 163 MG/DL — HIGH (ref 70–99)
HCT VFR BLD CALC: 33.9 % — LOW (ref 34.5–45)
HGB BLD-MCNC: 10.7 G/DL — LOW (ref 11.5–15.5)
MAGNESIUM SERPL-MCNC: 1.9 MG/DL — SIGNIFICANT CHANGE UP (ref 1.6–2.6)
MCHC RBC-ENTMCNC: 28 PG — SIGNIFICANT CHANGE UP (ref 27–34)
MCHC RBC-ENTMCNC: 31.6 GM/DL — LOW (ref 32–36)
MCV RBC AUTO: 88.7 FL — SIGNIFICANT CHANGE UP (ref 80–100)
NRBC # BLD: 0 /100 WBCS — SIGNIFICANT CHANGE UP (ref 0–0)
NRBC # FLD: 0 K/UL — SIGNIFICANT CHANGE UP (ref 0–0)
PHOSPHATE SERPL-MCNC: 3 MG/DL — SIGNIFICANT CHANGE UP (ref 2.5–4.5)
PLATELET # BLD AUTO: 362 K/UL — SIGNIFICANT CHANGE UP (ref 150–400)
POTASSIUM SERPL-MCNC: 4.3 MMOL/L — SIGNIFICANT CHANGE UP (ref 3.5–5.3)
POTASSIUM SERPL-SCNC: 4.3 MMOL/L — SIGNIFICANT CHANGE UP (ref 3.5–5.3)
PROT SERPL-MCNC: 6.9 G/DL — SIGNIFICANT CHANGE UP (ref 6–8.3)
RBC # BLD: 3.82 M/UL — SIGNIFICANT CHANGE UP (ref 3.8–5.2)
RBC # FLD: 15.9 % — HIGH (ref 10.3–14.5)
SODIUM SERPL-SCNC: 136 MMOL/L — SIGNIFICANT CHANGE UP (ref 135–145)
WBC # BLD: 14.59 K/UL — HIGH (ref 3.8–10.5)
WBC # FLD AUTO: 14.59 K/UL — HIGH (ref 3.8–10.5)

## 2023-06-22 RX ORDER — OXYCODONE HYDROCHLORIDE 5 MG/1
1 TABLET ORAL
Qty: 10 | Refills: 0
Start: 2023-06-22

## 2023-06-22 RX ORDER — HEPARIN SODIUM 5000 [USP'U]/ML
5000 INJECTION INTRAVENOUS; SUBCUTANEOUS EVERY 8 HOURS
Refills: 0 | Status: DISCONTINUED | OUTPATIENT
Start: 2023-06-22 | End: 2023-06-22

## 2023-06-22 RX ORDER — ONDANSETRON 8 MG/1
4 TABLET, FILM COATED ORAL ONCE
Refills: 0 | Status: COMPLETED | OUTPATIENT
Start: 2023-06-22 | End: 2023-06-22

## 2023-06-22 RX ADMIN — Medication 975 MILLIGRAM(S): at 05:11

## 2023-06-22 RX ADMIN — Medication 975 MILLIGRAM(S): at 12:00

## 2023-06-22 RX ADMIN — OXYCODONE HYDROCHLORIDE 10 MILLIGRAM(S): 5 TABLET ORAL at 05:49

## 2023-06-22 RX ADMIN — ONDANSETRON 4 MILLIGRAM(S): 8 TABLET, FILM COATED ORAL at 13:15

## 2023-06-22 RX ADMIN — OXYCODONE HYDROCHLORIDE 10 MILLIGRAM(S): 5 TABLET ORAL at 13:15

## 2023-06-22 RX ADMIN — Medication 975 MILLIGRAM(S): at 12:30

## 2023-06-22 RX ADMIN — OXYCODONE HYDROCHLORIDE 10 MILLIGRAM(S): 5 TABLET ORAL at 14:15

## 2023-06-22 RX ADMIN — PIPERACILLIN AND TAZOBACTAM 25 GRAM(S): 4; .5 INJECTION, POWDER, LYOPHILIZED, FOR SOLUTION INTRAVENOUS at 05:14

## 2023-06-22 RX ADMIN — PIPERACILLIN AND TAZOBACTAM 25 GRAM(S): 4; .5 INJECTION, POWDER, LYOPHILIZED, FOR SOLUTION INTRAVENOUS at 13:08

## 2023-06-22 RX ADMIN — OXYCODONE HYDROCHLORIDE 10 MILLIGRAM(S): 5 TABLET ORAL at 05:12

## 2023-06-22 RX ADMIN — HEPARIN SODIUM 5000 UNIT(S): 5000 INJECTION INTRAVENOUS; SUBCUTANEOUS at 07:01

## 2023-06-22 RX ADMIN — Medication 975 MILLIGRAM(S): at 05:49

## 2023-06-22 NOTE — PROGRESS NOTE ADULT - SUBJECTIVE AND OBJECTIVE BOX
ANESTHESIA POSTOP CHECK    47y Female POSTOP DAY 1      Vital Signs Last 24 Hrs  T(C): 36.7 (22 Jun 2023 06:00), Max: 37.3 (21 Jun 2023 13:56)  T(F): 98 (22 Jun 2023 06:00), Max: 99.1 (21 Jun 2023 13:56)  HR: 72 (22 Jun 2023 06:00) (58 - 86)  BP: 121/68 (22 Jun 2023 06:00) (107/63 - 126/66)  BP(mean): 71 (21 Jun 2023 19:30) (68 - 84)  RR: 18 (22 Jun 2023 06:00) (14 - 20)  SpO2: 98% (22 Jun 2023 06:00) (95% - 100%)    Parameters below as of 22 Jun 2023 06:00  Patient On (Oxygen Delivery Method): room air          [x ] NO APPARENT ANESTHESIA COMPLICATIONS

## 2023-06-22 NOTE — DISCHARGE NOTE NURSING/CASE MANAGEMENT/SOCIAL WORK - PATIENT PORTAL LINK FT
You can access the FollowMyHealth Patient Portal offered by [facility] by registering at the following website: [email or web address]. By joining [facility]’s FollowMyHealth portal, you will also be able to view your health information using other applications (apps) compatible with our system.

## 2023-06-22 NOTE — PROGRESS NOTE ADULT - SUBJECTIVE AND OBJECTIVE BOX
TEAM [ A ] Surgery Daily Progress Note  =====================================================    SUBJECTIVE: Patient seen and examined at bedside on AM rounds. ___ overnight events. Patient reports that they're feeling well.  Flatus/    BM. OOB/Amublating as tolerated. Denies nausea, vomiting, fever, chills, SOB, chest pain. ******************************    PAST MEDICAL & SURGICAL HISTORY:  Migraine  Asthma  last inhaler use  2005  Meniscus tear  right knee pain since April 2017  S/P left knee arthroscopy  8/1/16  H/O arthroscopy of right knee      ALLERGIES:  aspirin (Swelling; Angioedema)      --------------------------------------------------------------------------------------    MEDICATIONS:    Neurologic Medications  acetaminophen     Tablet .. 975 milliGRAM(s) Oral every 6 hours  oxyCODONE    IR 5 milliGRAM(s) Oral every 4 hours PRN Moderate Pain (4 - 6)  oxyCODONE    IR 10 milliGRAM(s) Oral every 4 hours PRN Severe Pain (7 - 10)    Respiratory Medications    Cardiovascular Medications    Gastrointestinal Medications    Genitourinary Medications    Hematologic/Oncologic Medications  heparin   Injectable 5000 Unit(s) SubCutaneous every 8 hours    Antimicrobial/Immunologic Medications  piperacillin/tazobactam IVPB.. 3.375 Gram(s) IV Intermittent every 8 hours    Endocrine/Metabolic Medications    Topical/Other Medications    --------------------------------------------------------------------------------------    VITAL SIGNS:  T(C): 37.1 (06-22-23 @ 02:15), Max: 37.3 (06-21-23 @ 13:56)  HR: 86 (06-22-23 @ 02:15) (58 - 86)  BP: 115/55 (06-22-23 @ 02:15) (107/58 - 126/66)  RR: 18 (06-22-23 @ 02:15) (14 - 20)  SpO2: 98% (06-22-23 @ 02:15) (95% - 100%)  --------------------------------------------------------------------------------------    EXAM  General: NAD, resting in bed comfortably. A&Ox4.   Cardiac: S1, S2. pulse present   Respiratory: Nonlabored respirations, normal cw expansion. No signs of respiratory distress.   Abdomen: soft, nondistended, slightly tender, lap sites c/d/i    Extremities: no deformities, moving all extremities spontaneously.     --------------------------------------------------------------------------------------    LABS                          10.6   10.41 )-----------( 342      ( 21 Jun 2023 05:35 )             33.6     06-21    138  |  101  |  14  ----------------------------<  95  4.5   |  23  |  0.64    Ca    9.2      21 Jun 2023 05:35  Phos  3.8     06-21  Mg     1.90     06-21    TPro  6.6  /  Alb  3.4  /  TBili  <0.2  /  DBili  x   /  AST  13  /  ALT  11  /  AlkPhos  87  06-21      --------------------------------------------------------------------------------------    INS AND OUTS:    06-20-23 @ 07:01  -  06-21-23 @ 07:00  --------------------------------------------------------  IN: 980 mL / OUT: 0 mL / NET: 980 mL    06-21-23 @ 07:01  -  06-22-23 @ 05:50  --------------------------------------------------------  IN: 2265 mL / OUT: 300 mL / NET: 1965 mL      --------------------------------------------------------------------------------------     TEAM [ A ] Surgery Daily Progress Note  =====================================================    SUBJECTIVE: Patient seen and examined at bedside on AM rounds. No overnight events. Patient reports that they're feeling well, pain well-controlled on regimen. States she is intermittently nauseous, no emesis. ReportsFlatus/ denies BM. OOB/Amublating as tolerated. Denies vomiting, fever, chills, SOB, chest pain.     PAST MEDICAL & SURGICAL HISTORY:  Migraine  Asthma  last inhaler use  2005  Meniscus tear  right knee pain since April 2017  S/P left knee arthroscopy  8/1/16  H/O arthroscopy of right knee      ALLERGIES:  aspirin (Swelling; Angioedema)      --------------------------------------------------------------------------------------    MEDICATIONS:    Neurologic Medications  acetaminophen     Tablet .. 975 milliGRAM(s) Oral every 6 hours  oxyCODONE    IR 5 milliGRAM(s) Oral every 4 hours PRN Moderate Pain (4 - 6)  oxyCODONE    IR 10 milliGRAM(s) Oral every 4 hours PRN Severe Pain (7 - 10)    Respiratory Medications    Cardiovascular Medications    Gastrointestinal Medications    Genitourinary Medications    Hematologic/Oncologic Medications  heparin   Injectable 5000 Unit(s) SubCutaneous every 8 hours    Antimicrobial/Immunologic Medications  piperacillin/tazobactam IVPB.. 3.375 Gram(s) IV Intermittent every 8 hours    Endocrine/Metabolic Medications    Topical/Other Medications    --------------------------------------------------------------------------------------    VITAL SIGNS:  T(C): 37.1 (06-22-23 @ 02:15), Max: 37.3 (06-21-23 @ 13:56)  HR: 86 (06-22-23 @ 02:15) (58 - 86)  BP: 115/55 (06-22-23 @ 02:15) (107/58 - 126/66)  RR: 18 (06-22-23 @ 02:15) (14 - 20)  SpO2: 98% (06-22-23 @ 02:15) (95% - 100%)  --------------------------------------------------------------------------------------    EXAM  General: NAD, resting in bed comfortably. A&Ox4.   Cardiac: S1, S2. pulse present   Respiratory: Nonlabored respirations, normal cw expansion. No signs of respiratory distress.   Abdomen: soft, nondistended, slightly tender, lap sites with dressing, no strikethrough  Extremities: no deformities, moving all extremities spontaneously.     --------------------------------------------------------------------------------------    LABS                          10.6   10.41 )-----------( 342      ( 21 Jun 2023 05:35 )             33.6     06-21    138  |  101  |  14  ----------------------------<  95  4.5   |  23  |  0.64    Ca    9.2      21 Jun 2023 05:35  Phos  3.8     06-21  Mg     1.90     06-21    TPro  6.6  /  Alb  3.4  /  TBili  <0.2  /  DBili  x   /  AST  13  /  ALT  11  /  AlkPhos  87  06-21      --------------------------------------------------------------------------------------    INS AND OUTS:    06-20-23 @ 07:01  -  06-21-23 @ 07:00  --------------------------------------------------------  IN: 980 mL / OUT: 0 mL / NET: 980 mL    06-21-23 @ 07:01  -  06-22-23 @ 05:50  --------------------------------------------------------  IN: 2265 mL / OUT: 300 mL / NET: 1965 mL      --------------------------------------------------------------------------------------

## 2023-06-22 NOTE — PROGRESS NOTE ADULT - ASSESSMENT
46 y/o female with PMHx asthma and migraines presented with abdominal pain, found to have cholelithiasis. Patient now s/p lap cholecystectomy on 6/21/23.      PLAN:  - Diet: Regular  - f/u AM labs, replete prn  - pain control prn  - OOB/ambulate as tolerated  - DVT ppx: SQH  - dispo: home today     A Team  [id number] 48 y/o female with PMHx asthma and migraines presented with abdominal pain, found to have cholelithiasis. Patient now s/p lap cholecystectomy on 6/21/23.      PLAN:  - Diet: Regular  - f/u AM labs, replete prn  - pain control prn  - continue abx   - OOB/ambulate as tolerated  - DVT ppx: SQH  - dispo: home today with augmentin    A Team  [id number]

## 2023-07-03 LAB — SURGICAL PATHOLOGY STUDY: SIGNIFICANT CHANGE UP

## 2023-09-15 ASSESSMENT — KOOS JR
RISING FROM SITTING: SEVERE
BENDING TO THE FLOOR TO PICK UP OBJECT: SEVERE
BENDING TO THE FLOOR TO PICK UP OBJECT: EXTREME
HOW SEVERE IS YOUR KNEE STIFFNESS AFTER FIRST WAKING IN MORNING: MODERATE
GOING UP OR DOWN STAIRS: EXTREME
RISING FROM SITTING: EXTREME
KOOS JR RAW SCORE: 22
TWISING OR PIVOTING ON KNEE: SEVERE
STRAIGHTENING KNEE FULLY: SEVERE
IMPORTED KOOS JR SCORE: 31.31
STANDING UPRIGHT: SEVERE
IMPORTED LATERALITY: RIGHT
KOOS JR RAW SCORE: 27
TWISING OR PIVOTING ON KNEE: MODERATE
HOW SEVERE IS YOUR KNEE STIFFNESS AFTER FIRST WAKING IN MORNING: SEVERE
KOOS JR RAW SCORE: 20
IMPORTED FORM: YES
GOING UP OR DOWN STAIRS: SEVERE
IMPORTED KOOS JR SCORE: 8.29
STRAIGHTENING KNEE FULLY: EXTREME
HOW SEVERE IS YOUR KNEE STIFFNESS AFTER FIRST WAKING IN MORNING: EXTREME
STANDING UPRIGHT: EXTREME
IMPORTED KOOS JR SCORE: 36.93

## 2024-06-06 NOTE — H&P PST ADULT - HEIGHT IN INCHES
1st attempt to schedule. LVM for Zina. Consult / Daniele. OK to schedule 6/11 at 11am per Patricia. Will need xray out of splint    0

## 2024-08-03 NOTE — BRIEF OPERATIVE NOTE - TYPE OF ANESTHESIA
[facility] Medicine Services  HISTORY AND PHYSICAL    Date of Admission: 8/2/2024  Primary Care Physician: Irving Alexis MD    Subjective   Primary Historian: The patient    Chief Complaint: Senning shortness of breath and leg swelling    Shortness of Breath    Leg Swelling      The patient is a pleasant 81-year-old male with a PMH significant for HTN, chronic respiratory failure baseline of 2.5 L as needed, HFpEF [Echo 7/3/2024 EF of 61 to 65% with left ventricular diastolic dysfunction and mild pulmonary HTN], recent DVT no AC in chart, GERD Hx of stage III CKD, Hx of lung adenocarcinoma, fibrotic lung disease who presented to the ED on 8/2/2024 on account of worsening shortness of breath requiring continuous use of oxygen for the past few days with associated leg swelling.  Patient states that the shortness of breath is worse with exertion and denies any chest pain, nausea, vomiting, loss of consciousness, fever or change in his urinary or bowel habits.  In the ED blood pressure was soft other vital signs were stable patient received IV Lasix 20 mg and says he is passing urine, labs troponin x 2 elevated, K5.4, , proBNP 295.5, BUN 30, CR 2.44, CBC unremarkable, respiratory panel negative and   Chest x-ray mass in the upper midlung zone on the right larger compared to prior study measuring the range of about 4.8 cm, stable pulmonary fibrosis with cardiomegaly  Patient be admitted for HF exacerbation with acute on chronic respiratory failure.  Patient is full code at this time.      Review of Systems   Respiratory:  Positive for shortness of breath.       Otherwise complete ROS reviewed and negative except as mentioned in the HPI.    Past Medical History:   Past Medical History:   Diagnosis Date    Arthritis     Atrial fibrillation with rapid ventricular response 05/31/2019    Blindness of left eye     BPH with obstruction/lower urinary tract symptoms     Cancer      stomach & lung    CHF (congestive heart failure)     CKD (chronic kidney disease) stage 3, GFR 30-59 ml/min     Coronary artery disease     Elevated cholesterol     Essential hypertension 10/02/2017    Fibrotic lung diseases     GERD (gastroesophageal reflux disease)     Hearing loss     History of transfusion     Hydronephrosis of left kidney     Hyperlipidemia     Hypertension     pt was taken off of all of his medications for BP (atenolol, lisinopril, lasix) because his BP kept bottoming out so his primary dr told him to discontinue them 1-2 months ago (Jan/Feb 2019). pt states he takes no medications currently.    Lung cancer     Mass of duodenum versus letty hepatis  04/27/2019    Mass of left renal hilum  04/27/2019    Mass of upper lobe of right lung 02/2019    mass is shrinking on its own, so pt states Dr. Patel is just going to keep an eye on it and not do surgery right now.    Mediastinal adenopathy 10/24/2018    [facility]    Monoclonal gammopathy of unknown significance (MGUS) 09/11/2018    Pancreatic mass     pt states he had this in 2013 but it went away on its own. Now recent CT shows it has come back so he is going to have an ultrasound on 3/13/19.    Shortness of breath      Past Surgical History:  Past Surgical History:   Procedure Laterality Date    ABDOMINAL SURGERY      BRONCHOSCOPY N/A 10/24/2018    Procedure: BRONCHOSCOPY WITH BIOPSY, EBUS;  Surgeon: Gareth Becerra MD;  Location: [facility] OR;  Service: Pulmonary    CHOLECYSTECTOMY      COLONOSCOPY N/A 1/3/2019    Procedure: COLONOSCOPY WITH ANESTHESIA;  Surgeon: Randy Somers DO;  Location: [facility] ENDOSCOPY;  Service: Gastroenterology    CYSTOSCOPY, RETROGRADE PYELOGRAM AND STENT INSERTION Left 3/8/2019    Procedure: CYSTOSCOPY RETROGRADE BILATERAL PYELOGRAM;  Surgeon: Jos Sylvester MD;  Location: [facility] OR;  Service: Urology    ENDOSCOPY N/A 12/11/2018    Procedure: ESOPHAGOGASTRODUODENOSCOPY WITH ANESTHESIA;  Surgeon: Yonis  Randy REED DO;  Location: [facility] ENDOSCOPY;  Service: Gastroenterology    ENDOSCOPY N/A 4/29/2019    Procedure: ESOPHAGOGASTRODUODENOSCOPY WITH ANESTHESIA;  Surgeon: Lilliam Jj MD;  Location: [facility] OR;  Service: Gastroenterology    ENDOSCOPY N/A 5/9/2019    Procedure: ESOPHAGOGASTRODUODENOSCOPY WITH ANESTHESIA;  Surgeon: Pilo Bansal MD;  Location: [facility] ENDOSCOPY;  Service: Gastroenterology    EYE SURGERY Left 1964    FOOT SURGERY Right 1966    joint    FRACTURE SURGERY       Social History:  reports that he quit smoking about 20 years ago. His smoking use included cigarettes. He started smoking about 69 years ago. He quit smokeless tobacco use about 54 years ago.  His smokeless tobacco use included chew. He reports that he does not drink alcohol and does not use drugs.    Family History: family history includes Hypertension in his mother; Leukemia in his father.       Allergies:  Allergies   Allergen Reactions    Penicillins Hives       Medications:  Prior to Admission medications    Medication Sig Start Date End Date Taking? Authorizing Provider   acetaminophen (TYLENOL) 500 MG tablet Take 2 tablets by mouth Every Night.    Eliecer Schultz MD   allopurinol (ZYLOPRIM) 100 MG tablet Take 1 tablet by mouth Daily.    Eliecer Schultz MD   aspirin 81 MG EC tablet Take 1 tablet by mouth Daily. 7/4/24   Zachary Lloyd MD   atorvastatin (LIPITOR) 20 MG tablet Take 1 tablet by mouth Every Night. 7/3/24   Zachary Lloyd MD   dronedarone (MULTAQ) 400 MG tablet Take 1 tablet by mouth Every 12 (Twelve) Hours. 7/4/24   Zachary Lloyd MD   fluticasone (FLONASE) 50 MCG/ACT nasal spray 1 spray into the nostril(s) as directed by provider Daily.    Eliecer Schultz MD   isosorbide mononitrate (IMDUR) 30 MG 24 hr tablet Take 1 tablet by mouth Daily. 7/4/24   Zachary Lloyd MD   losartan (COZAAR) 50 MG tablet Take 1 tablet by mouth Daily.    Eliecer Schultz MD   meclizine (ANTIVERT) 12.5 MG  "tablet Take 1 tablet by mouth 3 (Three) Times a Day As Needed for Dizziness.    Eliecer Schultz MD   melatonin 5 MG tablet tablet Take 2 tablets by mouth Every Night.    Eliecer Schultz MD   metoprolol succinate XL (Toprol XL) 50 MG 24 hr tablet Take 1 tablet by mouth Daily. 7/4/24   Zachary Lloyd MD   montelukast (SINGULAIR) 10 MG tablet Take 1 tablet by mouth Every Night.    Eliecer Schultz MD   multivitamin with minerals tablet tablet Take 1 tablet by mouth Daily.    Eliecer Schultz MD   pantoprazole (PROTONIX) 40 MG EC tablet Take 1 tablet by mouth Daily. 4/30/19   Hang Reddy DO   sodium bicarbonate 650 MG tablet Take 1 tablet by mouth 3 (Three) Times a Day.    Eliecer Schultz MD   tamsulosin (FLOMAX) 0.4 MG capsule 24 hr capsule Take 1 capsule by mouth Every Night.    lEiecer Schultz MD     I have utilized all available immediate resources to obtain, update, or review the patient's current medications (including all prescriptions, over-the-counter products, herbals, cannabis/cannabidiol products, and vitamin/mineral/dietary (nutritional) supplements).    Objective     Vital Signs: BP 97/65   Pulse 81   Temp 98.1 °F (36.7 °C) (Oral)   Resp 16   Ht 162.6 cm (64.02\")   Wt 77.6 kg (171 lb)   SpO2 95%   BMI 29.34 kg/m²   Physical Exam  Constitutional:       Appearance: Normal appearance. He is normal weight.   HENT:      Head: Normocephalic.      Ears:      Comments: Hard of hearing     Nose: Nose normal.      Mouth/Throat:      Mouth: Mucous membranes are moist.   Eyes:      Pupils: Pupils are equal, round, and reactive to light.   Cardiovascular:      Rate and Rhythm: Normal rate and regular rhythm.      Pulses: Normal pulses.   Pulmonary:      Effort: Pulmonary effort is normal.   Abdominal:      General: Bowel sounds are normal.      Palpations: Abdomen is soft.   Musculoskeletal:         General: Normal range of motion.      Cervical back: Neck supple.      " Right lower leg: Edema present.      Left lower leg: Edema present.   Skin:     General: Skin is warm and dry.      Findings: Bruising present.   Neurological:      General: No focal deficit present.      Mental Status: He is alert and oriented to person, place, and time.   Psychiatric:         Mood and Affect: Mood normal.        Results Reviewed:  Lab Results (last 24 hours)       Procedure Component Value Units Date/Time    High Sensitivity Troponin T 2Hr [[id number]]  (Abnormal) Collected: 08/02/24 1806    Specimen: Blood Updated: 08/02/24 1846     HS Troponin T 59 ng/L      Troponin T Delta -4 ng/L     Narrative:      High Sensitive Troponin T Reference Range:  <14.0 ng/L- Negative Female for AMI  <22.0 ng/L- Negative Male for AMI  >=14 - Abnormal Female indicating possible myocardial injury.  >=22 - Abnormal Male indicating possible myocardial injury.   Clinicians would have to utilize clinical acumen, EKG, Troponin, and serial changes to determine if it is an Acute Myocardial Infarction or myocardial injury due to an underlying chronic condition.         Respiratory Panel PCR w/COVID-19(SARS-CoV-2) JORDYN/MATILDE/BRYAN/PAD/COR/ASHER In-House, NP Swab in UTM/VTM, 2 HR TAT - Swab, Nasopharynx [[id number]]  (Normal) Collected: 08/02/24 1610    Specimen: Swab from Nasopharynx Updated: 08/02/24 1711     ADENOVIRUS, PCR Not Detected     Coronavirus 229E Not Detected     Coronavirus HKU1 Not Detected     Coronavirus NL63 Not Detected     Coronavirus OC43 Not Detected     COVID19 Not Detected     Human Metapneumovirus Not Detected     Human Rhinovirus/Enterovirus Not Detected     Influenza A PCR Not Detected     Influenza B PCR Not Detected     Parainfluenza Virus 1 Not Detected     Parainfluenza Virus 2 Not Detected     Parainfluenza Virus 3 Not Detected     Parainfluenza Virus 4 Not Detected     RSV, PCR Not Detected     Bordetella pertussis pcr Not Detected     Bordetella parapertussis PCR Not Detected     Chlamydophila  pneumoniae PCR Not Detected     Mycoplasma pneumo by PCR Not Detected    Narrative:      In the setting of a positive respiratory panel with a viral infection PLUS a negative procalcitonin without other underlying concern for bacterial infection, consider observing off antibiotics or discontinuation of antibiotics and continue supportive care. If the respiratory panel is positive for atypical bacterial infection (Bordetella pertussis, Chlamydophila pneumoniae, or Mycoplasma pneumoniae), consider antibiotic de-escalation to target atypical bacterial infection.    Comprehensive Metabolic Panel [[id number]]  (Abnormal) Collected: 08/02/24 1604    Specimen: Blood Updated: 08/02/24 1649     Glucose 126 mg/dL      BUN 30 mg/dL      Creatinine 2.44 mg/dL      Sodium 139 mmol/L      Potassium 5.4 mmol/L      Comment: Specimen hemolyzed.  Result may be falsely elevated.        Chloride 105 mmol/L      CO2 23.0 mmol/L      Calcium 9.6 mg/dL      Total Protein 6.7 g/dL      Albumin 3.5 g/dL      ALT (SGPT) 26 U/L      Comment: Specimen hemolyzed.  Result may  be falsely elevated.        AST (SGOT) 42 U/L      Comment: Specimen hemolyzed.  Result may be falsely elevated.        Alkaline Phosphatase 132 U/L      Total Bilirubin 0.4 mg/dL      Globulin 3.2 gm/dL      A/G Ratio 1.1 g/dL      BUN/Creatinine Ratio 12.3     Anion Gap 11.0 mmol/L      eGFR 25.8 mL/min/1.73     Narrative:      GFR Normal >60  Chronic Kidney Disease <60  Kidney Failure <15    The GFR formula is only valid for adults with stable renal function between ages 18 and 70.    High Sensitivity Troponin T [[id number]]  (Abnormal) Collected: 08/02/24 1604    Specimen: Blood Updated: 08/02/24 1649     HS Troponin T 63 ng/L      Comment: Specimen hemolyzed.  Results may be falsely decreased.       Narrative:      High Sensitive Troponin T Reference Range:  <14.0 ng/L- Negative Female for AMI  <22.0 ng/L- Negative Male for AMI  >=14 - Abnormal Female indicating  possible myocardial injury.  >=22 - Abnormal Male indicating possible myocardial injury.   Clinicians would have to utilize clinical acumen, EKG, Troponin, and serial changes to determine if it is an Acute Myocardial Infarction or myocardial injury due to an underlying chronic condition.         BNP [[id number]]  (Normal) Collected: 08/02/24 1604    Specimen: Blood Updated: 08/02/24 1646     proBNP 295.5 pg/mL     Narrative:      This assay is used as an aid in the diagnosis of individuals suspected of having heart failure. It can be used as an aid in the diagnosis of acute decompensated heart failure (ADHF) in patients presenting with signs and symptoms of ADHF to the emergency department (ED). In addition, NT-proBNP of <300 pg/mL indicates ADHF is not likely.    Age Range Result Interpretation  NT-proBNP Concentration (pg/mL:      <50             Positive            >450                   Gray                 300-450                    Negative             <300    50-75           Positive            >900                  Gray                300-900                  Negative            <300      >75             Positive            >1800                  Gray                300-1800                  Negative            <300    Lactic Acid, Plasma [[id number]]  (Normal) Collected: 08/02/24 1608    Specimen: Blood Updated: 08/02/24 1646     Lactate 1.4 mmol/L     CBC & Differential [[id number]]  (Abnormal) Collected: 08/02/24 1604    Specimen: Blood Updated: 08/02/24 1630    Narrative:      The following orders were created for panel order CBC & Differential.  Procedure                               Abnormality         Status                     ---------                               -----------         ------                     CBC Auto Differential[[id number]]        Abnormal            Final result                 Please view results for these tests on the individual orders.    CBC Auto Differential [[id number]]   (Abnormal) Collected: 08/02/24 1604    Specimen: Blood Updated: 08/02/24 1630     WBC 5.71 10*3/mm3      RBC 3.53 10*6/mm3      Hemoglobin 11.0 g/dL      Hematocrit 35.5 %      .6 fL      MCH 31.2 pg      MCHC 31.0 g/dL      RDW 17.2 %      RDW-SD 62.9 fl      MPV 11.4 fL      Platelets 203 10*3/mm3      Neutrophil % 68.5 %      Lymphocyte % 15.9 %      Monocyte % 11.7 %      Eosinophil % 2.3 %      Basophil % 0.5 %      Immature Grans % 1.1 %      Neutrophils, Absolute 3.91 10*3/mm3      Lymphocytes, Absolute 0.91 10*3/mm3      Monocytes, Absolute 0.67 10*3/mm3      Eosinophils, Absolute 0.13 10*3/mm3      Basophils, Absolute 0.03 10*3/mm3      Immature Grans, Absolute 0.06 10*3/mm3      nRBC 0.0 /100 WBC           Imaging Results (Last 24 Hours)       Procedure Component Value Units Date/Time    XR Chest 1 View [[id number]] Collected: 08/02/24 1618     Updated: 08/02/24 1626    Narrative:      EXAMINATION:  XR CHEST 1 VW-  8/2/2024 3:16 PM     HISTORY: Shortness of air. Lung cancer.     COMPARISON: 6/29/2024.     TECHNIQUE: Single view AP image.     FINDINGS: A mass in the upper to midlung zone on the right appears  increased in size measuring approximately 4.8 cm. Fibrotic lung changes  appear relatively stable. Pleural thickening/effusion on the right  appears relatively stable. There is cardiomegaly. The thoracic aorta is  atheromatous.          Impression:      1. A mass in the upper to midlung zone on the right is larger compared  to the prior study measuring in the range of about 4.8 cm.  2. Relatively stable pulmonary fibrosis. Pleural thickening/effusion on  the right also appears relatively stable.  3. Cardiomegaly.        The full report of this exam was immediately signed and available to the  emergency room. The patient is currently in the emergency room.           This report was signed and finalized on 8/2/2024 4:23 PM by Dr. Ramos Lagos MD.             I have personally reviewed and  interpreted the radiology studies and ECG obtained at time of admission.     Assessment / Plan   Assessment:   Active Hospital Problems    Diagnosis     **Bilateral lower extremity edema        Treatment Plan  The patient will be admitted to my service here at [facility].   - HFpEF exacerbation with bilateral lower extremity edema continue strict I's and O's, daily weight, continue IV Lasix 20 mg twice daily with fluid restriction if no improvement consider cardiology consult  - Acute on chronic respiratory failure possibly secondary to HFpEF in a background of fibrotic lung disease continue supplemental O2 with weaning parameters, continue pulse oximetry monitoring, patient with recent DVT  - Elevated troponin possibly secondary to HFpEF exacerbation with patient denying chest pain, continue as needed EKG for chest pain with telemetry monitoring  - Hyperkalemia K of 5.4 continue p.o. Lokelma 10 mg once and monitor serum K  - CKD stage IV continue strict I's and O's, avoid nephrotoxins and renal dose medications when possible patient will benefit from nephrology evaluation possibly outpatient  - Hx of recent DVT states he is on anticoagulation but not seen on his home medications will request pharmacy to verify  - Increasing lung mass on x-ray will consult oncology whom patient follows with already      Medical Decision Making  Number and Complexity of problems: 2  Differential Diagnosis: As above    Conditions and Status        Condition is unchanged.     MDM Data  External documents reviewed: Not indicated  Cardiac tracing (EKG, telemetry) interpretation: Reviewed  Radiology interpretation: Reviewed  Labs reviewed: Yes  Any tests that were considered but not ordered: No        Discussed with: The patient     Care Planning  Shared decision making: Patient and nursing staff  Code status and discussions: Full code    Disposition  Social Determinants of Health that impact treatment or disposition:  Hopefully home   Estimated length of stay is 2 to 3 days.     I confirmed that the patient's advanced care plan is present, code status is documented, and a surrogate decision maker is listed in the patient's medical record.       The patient was seen and examined by me on 8/2/2024 at 1940.    Electronically signed by Kaylie Garcia MD, 08/02/24, 19:51 CDT.             General

## 2024-08-09 ENCOUNTER — APPOINTMENT (OUTPATIENT)
Dept: ORTHOPEDIC SURGERY | Facility: CLINIC | Age: 49
End: 2024-08-09

## 2024-08-09 PROCEDURE — 73562 X-RAY EXAM OF KNEE 3: CPT | Mod: RT

## 2024-08-09 PROCEDURE — 99204 OFFICE O/P NEW MOD 45 MIN: CPT | Mod: 25

## 2024-08-09 PROCEDURE — 20610 DRAIN/INJ JOINT/BURSA W/O US: CPT | Mod: RT

## 2024-08-09 NOTE — IMAGING
[Right] : right knee [There are no fractures, subluxations or dislocations. No significant abnormalities are seen] : There are no fractures, subluxations or dislocations. No significant abnormalities are seen [Mild tricompartmental OA medial narrowing] : Mild tricompartmental OA medial narrowing [Moderate tricompartmental OA medial narrowing] : Moderate tricompartmental OA medial narrowing [de-identified] :   ----------------------------------------------------------------------------   Right knee exam:   Skin: no significant pertinent finding  Inspection:     (neg) Effusion     (neg) Malalignment     (neg) Swelling     (neg) Quad atrophy     (neg) J-sign  ROM:     0 - 120 degrees of flexion.  Tenderness:     (+) MJLT     (neg) LJLT     (+) Medial patellar facet tenderness     (+) Lateral patellar facet tenderness     (neg) Crepitus     (+) Patellar grind tenderness     (neg) Patellar tendon     (neg) Quad tendon     Other:  Stability:     (neg) Lachman     (neg) Varus/Valgus instability     (neg) Posterior drawer     (neg) Patellar translation: wnl  Additional tests:     (=) McMurrays test     (neg) Patellar apprehension     Other:  Strength: 5/5 Q/H/TA/GS/EHL  Neuro: In tact to light touch throughout, DTR's wnl  Vascularity: Extremity warm and well perfused  Gait: normal

## 2024-08-09 NOTE — HISTORY OF PRESENT ILLNESS
[10] : 10 [Dull/Aching] : dull/aching [Localized] : localized [Constant] : constant [Sleep] : sleep [Nothing helps with pain getting better] : Nothing helps with pain getting better [de-identified] : 47 yo F presenting with right knee pain that started within 1 year but gotten worse over the past 2 months. Swelling. Referred by PCP. No prior tx. Had arthroscopic sx on b/l knees about 9-10yr ago and has had previous CSI inj of the knees. Taking Ibu 800 mg with min to no relief [] : no [FreeTextEntry1] : RIGHT KNEE PAIN

## 2024-08-09 NOTE — DISCUSSION/SUMMARY
[de-identified] :  Plan for PT Plan for R knee CSI f/u 6-8 weeks ----------------------------------------------------------------------------   All relevant imaging studies pertinent to today's visit, including x-rays, MRI's and/or other advanced imaging studies (CT/etc) were independently interpreted and reviewed with the patient as needed. Implications of the studies together with the patient's clinical picture were discussed to formulate a working diagnosis and management options were detailed.   The patient and/or guardian was advised of the diagnosis.  The natural history of the pathology was explained in full. All questions were answered.  The risks and benefits of conservative and interventional treatment alternatives were explained to the patient  The patient and/or guardian was advised if any advanced diagnostic/imaging study (MRI/CT/etc) is ordered to evaluate potential pathology in the affected area(s), they should follow up in the office to review the results of the study and determine further management that may be indicated.     ----------------------------------------------------------------------------  Large joint corticosteroid injection given: Right knee  Patient indicated for injection after trial of rest, OTC medications including aspirin, Ibuprofen, Aleve etc or prescription NSAIDS, and/or exercises at home and/ or physical therapy without satisfactory response.  Patient has symptoms including pain, swelling, and/or decreased mobility in the joint. The risks, benefits, and alternatives to corticosteroid injection were explained in full to the patient, including but not limited to infection, sepsis, bleeding, scarring, skin discoloration, temporary increase in pain, syncopal episode, failure to resolve symptoms, allergic reaction, symptom recurrence, and elevation of blood sugar in diabetics. Patient understood the risks. All questions were answered. After discussion of options, patient requested an injection.   Oral informed consent was obtained and sterile technique was utilized for the procedure including the preparation of the solutions used for the injection and betadine followed by alcohol prep to the injection site. Anesthesia was given with ethyl chloride sprayed topically. The injection was delivered. Patient tolerated the procedure well.   Post Procedure Instructions: Patient was advised to call if redness, pain, or fever occur and apply ice for 15 min on and 15 min off later today  Medications delivered: Kenalog 10 mg, Lidocaine: 4 cc

## 2024-09-20 ENCOUNTER — NON-APPOINTMENT (OUTPATIENT)
Age: 49
End: 2024-09-20

## 2024-09-20 ENCOUNTER — APPOINTMENT (OUTPATIENT)
Dept: ORTHOPEDIC SURGERY | Facility: CLINIC | Age: 49
End: 2024-09-20
Payer: COMMERCIAL

## 2024-09-20 VITALS — WEIGHT: 153 LBS | BODY MASS INDEX: 28.16 KG/M2 | HEIGHT: 62 IN

## 2024-09-20 DIAGNOSIS — M17.11 UNILATERAL PRIMARY OSTEOARTHRITIS, RIGHT KNEE: ICD-10-CM

## 2024-09-20 PROCEDURE — 99214 OFFICE O/P EST MOD 30 MIN: CPT

## 2024-09-20 NOTE — IMAGING
[Right] : right knee [There are no fractures, subluxations or dislocations. No significant abnormalities are seen] : There are no fractures, subluxations or dislocations. No significant abnormalities are seen [Mild tricompartmental OA medial narrowing] : Mild tricompartmental OA medial narrowing [Moderate tricompartmental OA medial narrowing] : Moderate tricompartmental OA medial narrowing [de-identified] :   ----------------------------------------------------------------------------   Right knee exam:   Skin: no significant pertinent finding  Inspection:     (neg) Effusion     (neg) Malalignment     (neg) Swelling     (neg) Quad atrophy     (neg) J-sign  ROM:     0 - 120 degrees of flexion.  Tenderness:     (+) MJLT     (neg) LJLT     (+) Medial patellar facet tenderness     (+) Lateral patellar facet tenderness     (neg) Crepitus     (+) Patellar grind tenderness     (neg) Patellar tendon     (neg) Quad tendon     Other:  Stability:     (neg) Lachman     (neg) Varus/Valgus instability     (neg) Posterior drawer     (neg) Patellar translation: wnl  Additional tests:     (=) McMurrays test     (neg) Patellar apprehension     Other:  Strength: 5/5 Q/H/TA/GS/EHL  Neuro: In tact to light touch throughout, DTR's wnl  Vascularity: Extremity warm and well perfused  Gait: mild antalgic

## 2024-09-20 NOTE — HISTORY OF PRESENT ILLNESS
[10] : 10 [7] : 7 [Dull/Aching] : dull/aching [Localized] : localized [Constant] : constant [Sleep] : sleep [Nothing helps with pain getting better] : Nothing helps with pain getting better [de-identified] : 47 yo F presenting with right knee pain that started within 1 year but gotten worse over the past 2 months. Swelling. Referred by PCP. No prior tx. Had arthroscopic sx on b/l knees about 9-10yr ago and has had previous CSI inj of the knees. Taking Ibu 800 mg with min to no relief [] : no [FreeTextEntry1] : RIGHT KNEE PAIN [de-identified] : physical therapy

## 2024-09-20 NOTE — REASON FOR VISIT
[FreeTextEntry2] : Follow up right knee. Pain continues unchanged, she continue to c/o numbness along medial and lateral joint line. States CSI to right knee provided minimal relief. PT with minimal improvement.

## 2024-09-20 NOTE — DISCUSSION/SUMMARY
[de-identified] : Plan for PT Plan for MRI Discussion R Knee Visco 3  f/u 6-8 weeks  ----------------------------------------------------------------------------   All relevant imaging studies pertinent to today's visit, including x-rays, MRI's and/or other advanced imaging studies (CT/etc) were independently interpreted and reviewed with the patient as needed. Implications of the studies together with the patient's clinical picture were discussed to formulate a working diagnosis and management options were detailed.   The patient and/or guardian was advised of the diagnosis.  The natural history of the pathology was explained in full. All questions were answered.  The risks and benefits of conservative and interventional treatment alternatives were explained to the patient  The patient and/or guardian was advised if any advanced diagnostic/imaging study (MRI/CT/etc) is ordered to evaluate potential pathology in the affected area(s), they should follow up in the office to review the results of the study and determine further management that may be indicated.

## 2024-10-04 ENCOUNTER — APPOINTMENT (OUTPATIENT)
Dept: MRI IMAGING | Facility: CLINIC | Age: 49
End: 2024-10-04

## 2024-10-04 PROCEDURE — 73721 MRI JNT OF LWR EXTRE W/O DYE: CPT | Mod: RT

## 2024-10-18 ENCOUNTER — APPOINTMENT (OUTPATIENT)
Dept: ORTHOPEDIC SURGERY | Facility: CLINIC | Age: 49
End: 2024-10-18
Payer: COMMERCIAL

## 2024-10-18 DIAGNOSIS — M17.11 UNILATERAL PRIMARY OSTEOARTHRITIS, RIGHT KNEE: ICD-10-CM

## 2024-10-18 PROCEDURE — 99213 OFFICE O/P EST LOW 20 MIN: CPT

## 2024-10-31 ENCOUNTER — APPOINTMENT (OUTPATIENT)
Dept: ORTHOPEDIC SURGERY | Facility: CLINIC | Age: 49
End: 2024-10-31
Payer: COMMERCIAL

## 2024-10-31 DIAGNOSIS — M17.11 UNILATERAL PRIMARY OSTEOARTHRITIS, RIGHT KNEE: ICD-10-CM

## 2024-10-31 PROCEDURE — 99215 OFFICE O/P EST HI 40 MIN: CPT

## 2024-11-05 NOTE — H&P PST ADULT - SKIN
Attempted to call report to [facility].  asked for direct extension for the charge nurse to call back for report.    warm and dry/color normal/normal/no rashes/no ulcers

## 2024-11-07 ENCOUNTER — NON-APPOINTMENT (OUTPATIENT)
Age: 49
End: 2024-11-07

## 2024-11-07 ASSESSMENT — KOOS JR
TWISING OR PIVOTING ON KNEE: EXTREME
GOING UP OR DOWN STAIRS: EXTREME
KOOS JR RAW SCORE: 28
RISING FROM SITTING: EXTREME
HOW SEVERE IS YOUR KNEE STIFFNESS AFTER FIRST WAKING IN MORNING: EXTREME
STRAIGHTENING KNEE FULLY: EXTREME
BENDING TO THE FLOOR TO PICK UP OBJECT: EXTREME
STANDING UPRIGHT: EXTREME

## 2024-11-13 ENCOUNTER — APPOINTMENT (OUTPATIENT)
Dept: CT IMAGING | Facility: CLINIC | Age: 49
End: 2024-11-13
Payer: COMMERCIAL

## 2024-11-13 PROCEDURE — 73700 CT LOWER EXTREMITY W/O DYE: CPT | Mod: RT

## 2024-11-15 ENCOUNTER — OUTPATIENT (OUTPATIENT)
Dept: OUTPATIENT SERVICES | Facility: HOSPITAL | Age: 49
LOS: 1 days | Discharge: HOME | End: 2024-11-15

## 2024-11-15 DIAGNOSIS — M23.303 OTHER MENISCUS DERANGEMENTS, UNSPECIFIED MEDIAL MENISCUS, RIGHT KNEE: ICD-10-CM

## 2024-11-15 DIAGNOSIS — Z98.890 OTHER SPECIFIED POSTPROCEDURAL STATES: Chronic | ICD-10-CM

## 2024-11-15 DIAGNOSIS — G43.909 MIGRAINE, UNSPECIFIED, NOT INTRACTABLE, WITHOUT STATUS MIGRAINOSUS: ICD-10-CM

## 2024-11-15 DIAGNOSIS — Z01.818 ENCOUNTER FOR OTHER PREPROCEDURAL EXAMINATION: ICD-10-CM

## 2024-11-15 DIAGNOSIS — Z90.710 ACQUIRED ABSENCE OF BOTH CERVIX AND UTERUS: Chronic | ICD-10-CM

## 2024-11-15 LAB
ALBUMIN SERPL ELPH-MCNC: 3 G/DL — LOW (ref 3.3–5)
ALP SERPL-CCNC: 71 U/L — SIGNIFICANT CHANGE UP (ref 40–120)
ALT FLD-CCNC: 19 U/L — SIGNIFICANT CHANGE UP (ref 12–78)
ANION GAP SERPL CALC-SCNC: 5 MMOL/L — SIGNIFICANT CHANGE UP (ref 5–17)
APTT BLD: 30.5 SEC — SIGNIFICANT CHANGE UP (ref 24.5–35.6)
AST SERPL-CCNC: 16 U/L — SIGNIFICANT CHANGE UP (ref 15–37)
BASOPHILS # BLD AUTO: 0.03 K/UL — SIGNIFICANT CHANGE UP (ref 0–0.2)
BASOPHILS NFR BLD AUTO: 0.3 % — SIGNIFICANT CHANGE UP (ref 0–2)
BILIRUB SERPL-MCNC: 0.3 MG/DL — SIGNIFICANT CHANGE UP (ref 0.2–1.2)
BLD GP AB SCN SERPL QL: SIGNIFICANT CHANGE UP
BUN SERPL-MCNC: 11 MG/DL — SIGNIFICANT CHANGE UP (ref 7–23)
CALCIUM SERPL-MCNC: 9.3 MG/DL — SIGNIFICANT CHANGE UP (ref 8.5–10.1)
CHLORIDE SERPL-SCNC: 104 MMOL/L — SIGNIFICANT CHANGE UP (ref 96–108)
CO2 SERPL-SCNC: 30 MMOL/L — SIGNIFICANT CHANGE UP (ref 22–31)
CREAT SERPL-MCNC: 0.88 MG/DL — SIGNIFICANT CHANGE UP (ref 0.5–1.3)
EGFR: 81 ML/MIN/1.73M2 — SIGNIFICANT CHANGE UP
EOSINOPHIL # BLD AUTO: 0.08 K/UL — SIGNIFICANT CHANGE UP (ref 0–0.5)
EOSINOPHIL NFR BLD AUTO: 0.8 % — SIGNIFICANT CHANGE UP (ref 0–6)
GLUCOSE SERPL-MCNC: 118 MG/DL — HIGH (ref 70–99)
HCG SERPL-ACNC: <1 MIU/ML — SIGNIFICANT CHANGE UP
HCT VFR BLD CALC: 37.7 % — SIGNIFICANT CHANGE UP (ref 34.5–45)
HGB BLD-MCNC: 12.3 G/DL — SIGNIFICANT CHANGE UP (ref 11.5–15.5)
IMM GRANULOCYTES NFR BLD AUTO: 0.5 % — SIGNIFICANT CHANGE UP (ref 0–0.9)
INR BLD: 1.09 RATIO — SIGNIFICANT CHANGE UP (ref 0.85–1.16)
LYMPHOCYTES # BLD AUTO: 3.3 K/UL — SIGNIFICANT CHANGE UP (ref 1–3.3)
LYMPHOCYTES # BLD AUTO: 32.3 % — SIGNIFICANT CHANGE UP (ref 13–44)
MCHC RBC-ENTMCNC: 29.6 PG — SIGNIFICANT CHANGE UP (ref 27–34)
MCHC RBC-ENTMCNC: 32.6 G/DL — SIGNIFICANT CHANGE UP (ref 32–36)
MCV RBC AUTO: 90.8 FL — SIGNIFICANT CHANGE UP (ref 80–100)
MONOCYTES # BLD AUTO: 0.76 K/UL — SIGNIFICANT CHANGE UP (ref 0–0.9)
MONOCYTES NFR BLD AUTO: 7.4 % — SIGNIFICANT CHANGE UP (ref 2–14)
NEUTROPHILS # BLD AUTO: 6.01 K/UL — SIGNIFICANT CHANGE UP (ref 1.8–7.4)
NEUTROPHILS NFR BLD AUTO: 58.7 % — SIGNIFICANT CHANGE UP (ref 43–77)
NRBC # BLD: 0 /100 WBCS — SIGNIFICANT CHANGE UP (ref 0–0)
PLATELET # BLD AUTO: 335 K/UL — SIGNIFICANT CHANGE UP (ref 150–400)
POTASSIUM SERPL-MCNC: 3.8 MMOL/L — SIGNIFICANT CHANGE UP (ref 3.5–5.3)
POTASSIUM SERPL-SCNC: 3.8 MMOL/L — SIGNIFICANT CHANGE UP (ref 3.5–5.3)
PROT SERPL-MCNC: 7 GM/DL — SIGNIFICANT CHANGE UP (ref 6–8.3)
PROTHROM AB SERPL-ACNC: 12.6 SEC — SIGNIFICANT CHANGE UP (ref 9.9–13.4)
RBC # BLD: 4.15 M/UL — SIGNIFICANT CHANGE UP (ref 3.8–5.2)
RBC # FLD: 14.2 % — SIGNIFICANT CHANGE UP (ref 10.3–14.5)
SODIUM SERPL-SCNC: 139 MMOL/L — SIGNIFICANT CHANGE UP (ref 135–145)
WBC # BLD: 10.23 K/UL — SIGNIFICANT CHANGE UP (ref 3.8–10.5)
WBC # FLD AUTO: 10.23 K/UL — SIGNIFICANT CHANGE UP (ref 3.8–10.5)

## 2024-11-15 NOTE — OCCUPATIONAL THERAPY INITIAL EVALUATION ADULT - ADDITIONAL COMMENTS
Pt lives with family (Who cant assist post op) in a private house with 5 steps to enter with a R handrail. Once inside, the pt has 10 steps (No rails) to a platform and then another 10 steps (No rails) to reach the main floor where the bedroom and bathroom is. The pts bathroom has a tub/shower combination, fixed shower head, standard toilet seat and no grab bars. The pt ambulates with no device and does not own any device for ambulation. The pt daily pain is a 0/10 at rest and a 10/10 with movement. The pt manages the pain with rest and Ibuprofen 600mg. The pt has no recent outpatient PT, no recent falls and has buckling of the knee sometimes. The pt wears glasses for reading, R handed, drives and has no hearing impairments.

## 2024-11-15 NOTE — H&P PST ADULT - ASSESSMENT
48F PMH Asthma (not on medications- no exacerbations) migraines presents to [facility] for scheduled robotic assisted right total vs partial knee arthroplasty with LITZY on 24 with Dr.Leung CAPRINI SCORE    AGE RELATED RISK FACTORS                                                             [ x] Age 41-60 years                                            (1 Point)  [ ] Age: 61-74 years                                           (2 Points)                 [ ] Age= 75 years                                                (3 Points)             DISEASE RELATED RISK FACTORS                                                       [ ] Edema in the lower extremities                 (1 Point)                     [ ] Varicose veins                                               (1 Point)                                 [ ] BMI > 25 Kg/m2                                            (1 Point)                                  [ ] Serious infection (ie PNA)                            (1 Point)                     [ ] Lung disease ( COPD, Emphysema)            (1 Point)                                                                          [ ] Acute myocardial infarction                         (1 Point)                  [ ] Congestive heart failure (in the previous month)  (1 Point)         [ ] Inflammatory bowel disease                            (1 Point)                  [ ] Central venous access, PICC or Port               (2 points)       (within the last month)                                                                [ ] Stroke (in the previous month)                        (5 Points)    [ ] Previous or present malignancy                       (2 points)                                                                                                                                                         HEMATOLOGY RELATED FACTORS                                                         [ ] Prior episodes of VTE                                     (3 Points)                     [ ] Positive family history for VTE                      (3 Points)                  [ ] Prothrombin 67904 A                                     (3 Points)                     [ ] Factor V Leiden                                                (3 Points)                        [ ] Lupus anticoagulants                                      (3 Points)                                                           [ ] Anticardiolipin antibodies                              (3 Points)                                                       [ ] High homocysteine in the blood                   (3 Points)                                             [ ] Other congenital or acquired thrombophilia      (3 Points)                                                [ ] Heparin induced thrombocytopenia                  (3 Points)                                        MOBILITY RELATED FACTORS  [ ] Bed rest                                                         (1 Point)  [ ] Plaster cast                                                    (2 points)  [ ] Bed bound for more than 72 hours           (2 Points)    GENDER SPECIFIC FACTORS  [ ] Pregnancy or had a baby within the last month   (1 Point)  [ ] Post-partum < 6 weeks                                   (1 Point)  [ ] Hormonal therapy  or oral contraception   (1 Point)  [ ] History of pregnancy complications              (1 point)  [ ] Unexplained or recurrent              (1 Point)    OTHER RISK FACTORS                                           (1 Point)  [ ] BMI >40, smoking, diabetes requiring insulin, chemotherapy  blood transfusions and length of surgery over 2 hours    SURGERY RELATED RISK FACTORS  [ ]  Section within the last month     (1 Point)  [ ] Minor surgery                                                  (1 Point)  [ ] Arthroscopic surgery                                       (2 Points)  [ ] Planned major surgery lasting more            (2 Points)      than 45 minutes     [x ] Elective hip or knee joint replacement       (5 points)       surgery                                                TRAUMA RELATED RISK FACTORS  [ ] Fracture of the hip, pelvis, or leg                       (5 Points)  [ ] Spinal cord injury resulting in paralysis             (5 points)       (in the previous month)    [ ] Paralysis  (less than 1 month)                             (5 Points)  [ ] Multiple Trauma within 1 month                        (5 Points)    Total Score [     6   ]    Caprini Score 0-2: Low Risk, NO VTE prophylaxis required for most patients, encourage ambulation  Caprini Score 3-6: Moderate Risk , pharmacologic VTE prophylaxis is indicated for most patients (in the absence of contraindications)  Caprini Score Greater than or =7: High risk, pharmocologic VTE prophylaxis indicated for most patients (in the absence of contraindications)

## 2024-11-15 NOTE — H&P PST ADULT - HISTORY OF PRESENT ILLNESS
47 year old female with  PMH of  Asthma (not on medications- no exacerbations) presents to Presurgical testing with diagnosis of leiomyoma of uterus unspecified scheduled for total laparoscopic hysterectomy, vaginal natural orifice transluminal endoscopic surgery.    48F PMH Asthma (not on medications- no exacerbations) migraines presents to PST for scheduled robotic assisted right total vs partial knee arthroplasty with LITZY on 12/4/24 with      goal: to walk without pain

## 2024-11-15 NOTE — H&P PST ADULT - NSICDXPASTSURGICALHX_GEN_ALL_CORE_FT
PAST SURGICAL HISTORY:  H/O arthroscopy of right knee     S/P left knee arthroscopy 8/1/16     PAST SURGICAL HISTORY:  H/O arthroscopy of right knee     H/O: hysterectomy     S/P left knee arthroscopy 8/1/16

## 2024-11-15 NOTE — OCCUPATIONAL THERAPY INITIAL EVALUATION ADULT - NSOTDISCHREC_GEN_A_CORE
Home with home OT for home safety evaluation and to improve functional ADLs and transfers/mobility. Pt prefers to go to rehab post op./Home OT

## 2024-11-15 NOTE — OCCUPATIONAL THERAPY INITIAL EVALUATION ADULT - PERTINENT HX OF CURRENT PROBLEM, REHAB EVAL
R knee OA which impacts pts ability to perform functional tasks/transfers and mobility. Pt is scheduled for R TKR vs partial replacement on 12/4/24.

## 2024-11-15 NOTE — OCCUPATIONAL THERAPY INITIAL EVALUATION ADULT - TRANSFER SAFETY CONCERNS NOTED: SIT/STAND, REHAB EVAL
decreased sequencing ability/decreased step length/decreased weight-shifting ability Clear bilaterally, pupils equal, round and reactive to light.

## 2024-11-16 LAB
A1C WITH ESTIMATED AVERAGE GLUCOSE RESULT: 6.1 % — HIGH (ref 4–5.6)
ESTIMATED AVERAGE GLUCOSE: 128 MG/DL — HIGH (ref 68–114)
MRSA PCR RESULT.: SIGNIFICANT CHANGE UP
S AUREUS DNA NOSE QL NAA+PROBE: SIGNIFICANT CHANGE UP
VIT D25+D1,25 OH+D1,25 PNL SERPL-MCNC: 93.1 PG/ML — HIGH (ref 19.9–79.3)

## 2024-11-19 ENCOUNTER — NON-APPOINTMENT (OUTPATIENT)
Age: 49
End: 2024-11-19

## 2024-12-02 ENCOUNTER — APPOINTMENT (OUTPATIENT)
Dept: ORTHOPEDIC SURGERY | Facility: CLINIC | Age: 49
End: 2024-12-02

## 2024-12-03 ENCOUNTER — APPOINTMENT (OUTPATIENT)
Dept: ORTHOPEDIC SURGERY | Facility: CLINIC | Age: 49
End: 2024-12-03

## 2024-12-03 DIAGNOSIS — Z96.651 PRESENCE OF RIGHT ARTIFICIAL KNEE JOINT: ICD-10-CM

## 2024-12-03 RX ORDER — GABAPENTIN 300 MG/1
300 CAPSULE ORAL
Qty: 28 | Refills: 0 | Status: ACTIVE | COMMUNITY
Start: 2024-12-03 | End: 1900-01-01

## 2024-12-03 RX ORDER — CELECOXIB 200 MG/1
200 CAPSULE ORAL TWICE DAILY
Qty: 28 | Refills: 0 | Status: ACTIVE | COMMUNITY
Start: 2024-12-03 | End: 1900-01-01

## 2024-12-03 RX ORDER — ACETAMINOPHEN 500 MG/1
500 TABLET ORAL
Qty: 150 | Refills: 0 | Status: ACTIVE | COMMUNITY
Start: 2024-12-03 | End: 1900-01-01

## 2024-12-03 RX ORDER — DOCUSATE SODIUM 100 MG/1
100 CAPSULE ORAL TWICE DAILY
Qty: 60 | Refills: 0 | Status: ACTIVE | COMMUNITY
Start: 2024-12-03 | End: 1900-01-01

## 2024-12-03 RX ORDER — ESOMEPRAZOLE MAGNESIUM 20 MG/1
20 CAPSULE, DELAYED RELEASE ORAL
Qty: 42 | Refills: 0 | Status: ACTIVE | COMMUNITY
Start: 2024-12-03 | End: 1900-01-01

## 2024-12-03 RX ORDER — CYCLOBENZAPRINE HYDROCHLORIDE 10 MG/1
10 TABLET, FILM COATED ORAL
Qty: 14 | Refills: 0 | Status: ACTIVE | COMMUNITY
Start: 2024-12-03 | End: 1900-01-01

## 2024-12-04 ENCOUNTER — OUTPATIENT (OUTPATIENT)
Dept: OUTPATIENT SERVICES | Facility: HOSPITAL | Age: 49
LOS: 1 days | Discharge: HOME | End: 2024-12-04

## 2024-12-04 ENCOUNTER — TRANSCRIPTION ENCOUNTER (OUTPATIENT)
Age: 49
End: 2024-12-04

## 2024-12-04 ENCOUNTER — RESULT REVIEW (OUTPATIENT)
Age: 49
End: 2024-12-04

## 2024-12-04 ENCOUNTER — APPOINTMENT (OUTPATIENT)
Dept: ORTHOPEDIC SURGERY | Facility: HOSPITAL | Age: 49
End: 2024-12-04
Payer: COMMERCIAL

## 2024-12-04 DIAGNOSIS — R73.03 PREDIABETES: ICD-10-CM

## 2024-12-04 DIAGNOSIS — Z90.710 ACQUIRED ABSENCE OF BOTH CERVIX AND UTERUS: Chronic | ICD-10-CM

## 2024-12-04 DIAGNOSIS — Z88.6 ALLERGY STATUS TO ANALGESIC AGENT: ICD-10-CM

## 2024-12-04 DIAGNOSIS — Z98.890 OTHER SPECIFIED POSTPROCEDURAL STATES: Chronic | ICD-10-CM

## 2024-12-04 DIAGNOSIS — M17.11 UNILATERAL PRIMARY OSTEOARTHRITIS, RIGHT KNEE: ICD-10-CM

## 2024-12-04 PROCEDURE — 27447 TOTAL KNEE ARTHROPLASTY: CPT | Mod: AS,RT

## 2024-12-04 PROCEDURE — 27447 TOTAL KNEE ARTHROPLASTY: CPT | Mod: RT

## 2024-12-04 PROCEDURE — 20985 CPTR-ASST DIR MS PX: CPT

## 2024-12-06 RX ORDER — RIMEGEPANT SULFATE 75 MG/75MG
1 TABLET, ORALLY DISINTEGRATING ORAL
Refills: 0 | DISCHARGE

## 2024-12-07 ENCOUNTER — TRANSCRIPTION ENCOUNTER (OUTPATIENT)
Age: 49
End: 2024-12-07

## 2024-12-11 ENCOUNTER — NON-APPOINTMENT (OUTPATIENT)
Age: 49
End: 2024-12-11

## 2024-12-19 ENCOUNTER — APPOINTMENT (OUTPATIENT)
Dept: ORTHOPEDIC SURGERY | Facility: CLINIC | Age: 49
End: 2024-12-19
Payer: COMMERCIAL

## 2024-12-19 DIAGNOSIS — Z96.651 PRESENCE OF RIGHT ARTIFICIAL KNEE JOINT: ICD-10-CM

## 2024-12-19 PROCEDURE — 99024 POSTOP FOLLOW-UP VISIT: CPT

## 2024-12-19 PROCEDURE — 73562 X-RAY EXAM OF KNEE 3: CPT | Mod: RT

## 2024-12-30 NOTE — ASU PATIENT PROFILE, ADULT - NS PRO INFO GIVEN TO
Orthopedic Surgery Pre-Op Plan: Gemma Cowart  pre-op review. This is NOT an H&P   Surgeon: Dr. Richmond   Hospital: [facility]  Name of Surgery: Right Direct Anterior Total Hip Arthroplasty  Date of Surgery: 12/31/24  H&P: Completed on 12/20/24 by Dr. Arminda Foley at [facility].   History of ASA, NSAIDS, vitamin and/or herbal supplements, GLP-1 Agonist or SGLT Inhibitor medication taken within 10 days?: No  History of blood thinners?: Yes: On chronic anticoagulation with apixaban (Eliquis) for history of DVT.  Patient was instructed to hold Eliquis for 3 full days before surgery (take last dose on 12/27/2024, then hold).     Plan:   1) Discharge Plan: Home POD 1 with assist of daughter, Donna.  Please see Discharge Planning section near bottom of this note for further details.     2) History of Chronic Iron Deficiency Anemia: Hemoglobin 11.0 at preop exam on 12/3/2024.  Cleared by PCP for surgery.  Patient does have history of chronic iron deficiency anemia and chronic kidney disease.  Follows with Hematology/Oncology at [facility]. Most recent Hematology visit note from 12/18/24 indicated that they might schedule patient for IV iron infusion prior to hip surgery, however this was never scheduled.  Dr. Richmond notified and is OK proceeding with surgery.  I recommend close monitoring of postop hemoglobin.    3) History of DVT: reported history of 4 past DVT's. Follows with Hematology/Oncology at is on chronic anticoagulation with apixaban (Eliquis).  Patient was instructed to hold Eliquis for 3 full days before surgery (take last dose on 12/27/2024, then hold).  After surgery, we will plan to resume Eliquis as soon as safe from a bleeding standpoint per Dr. Richmond's team.     4) History of SVT: S/P Ablation in 2015: Most recent EKG on 10/8/24 showed sinus bradycardia but no evidence of arrhythmia.     5) Heart Murmur: Echocardiogram below 12/4/23 showed no significant valvular  heart disease.   Echocardiogram 12/4/23:  Interpretation Summary   The left ventricle is normal in size.  There is mild concentric left ventricular hypertrophy.  The visual ejection fraction is 60-65%.  No regional wall motion abnormalities noted.  No significant valvular heart disease.    6) COPD: Stable.  Denies any recent exacerbations.  Patient does not require supplemental oxygen at baseline.  On Spiriva.    7) Type 2 Diabetes Mellitus: Excellent recent control.  Hemoglobin A1c 5.6 on 10/8/2024.  Not on any medications for diabetes. I recommend blood glucose checks at least three times a day and at bedtime during hospital stay. Goal BG < 180 to decrease risk for infection and wound healing complications. Nursing to please notify Hospitalist if BG > 180.      8) Chronic Kidney Disease: Stage 3b: Recent renal function slightly worse than baseline.  Creatinine 1.7, GFR 30, BUN 30.6 on 12/3/2024.  Appears baseline creatinine is typically around 1.5-1.6 range.  We will check updated renal function as part of BMP on day of surgery. I recommend avoiding or limiting use of nephrotoxins like NSAIDs, promoting good post-op hydration and monitoring post-op kidney function closely.     9) Anxiety: On escitalopram.    10) History of Breast Cancer: S/P Bilateral Mastectomies: Follows with [facility] Hematology/Oncology. Completed 5 years on Femara in 2017. Will continue to follow with Oncology.     Patient appears medically optimized for upcoming surgery. I would recommend Hospitalist Consult to assist with medical management. Please call me below with any questions on this patient.       Review of Systems Notable for: History of chronic iron deficiency anemia, history of multiple DVTs-on chronic anticoagulation with Eliquis, history of SVT-s/p ablation in 2015, heart murmur-no significant valvular heart disease seen on most recent echocardiogram on 12/4/2023, COPD, type 2 diabetes mellitus-excellent control, chronic  patient kidney disease-stage 3b, anxiety, history of breast cancer-s/p bilateral mastectomies.    Past Medical History:   Past Medical History:   Diagnosis Date    Anemia     iron deficiency anemia    Antiplatelet or antithrombotic long-term use     Anxiety     Arrhythmia     Breast cancer (H) 2012    Bilateral Masectomies 9/2012    Complications of gastric bypass surgery     COPD (chronic obstructive pulmonary disease) (H) 04/21/2021    Diabetes mellitus (H)     diet controlled    DVT (deep venous thrombosis) (H)     Eczema     Fracture of left knee region     patella - vertical    History of blood transfusion     History of kidney stones     Iron deficiency anemia, unspecified iron deficiency anemia type 10/27/2021    Noninfectious ileitis     Obesity     Palpitation     with no treatment    Polyneuropathy     Pruritus     generalized    Renal disease     Rosacea     SVT (supraventricular tachycardia) (H)     s/p ablation 5/28/2015 at [facility] for left lateral AP    Thrombosis     Thyroid disease     Vitamin B12 deficiency 02/01/2013     Past Surgical History:   Procedure Laterality Date    ABDOMEN SURGERY  2000    gastric bypass 2000    COLONOSCOPY  10/11/2012    Procedure: COLONOSCOPY;  colonoscopy;  Surgeon: Gela Cleary MD;  Location:  GI    COLONOSCOPY N/A 10/6/2017    Procedure: COLONOSCOPY;;  Surgeon: Shashank Ortiz MD;  Location:  GI    COLONOSCOPY N/A 3/7/2022    Procedure: COLONOSCOPY;  Surgeon: Andrea Daniel MD;  Location:  GI    CYSTOSCOPY, SLING TRANSVAGINAL N/A 11/5/2024    Procedure: cystoscopy, mid urethral sling placement;  Surgeon: Shanique Paniagua MD;  Location:  OR    DENTAL SURGERY  5/2007    all teeth removed - dentures    ENT SURGERY      all teeth pulled    ESOPHAGOSCOPY, GASTROSCOPY, DUODENOSCOPY (EGD), COMBINED N/A 10/6/2017    Procedure: COMBINED ESOPHAGOSCOPY, GASTROSCOPY, DUODENOSCOPY (EGD);  ESOPHAGOSCOPY, GASTROSCOPY, DUODENOSCOPY (EGD) & Colonoscopy *Needs  INR on arrival;  Surgeon: Shashank Ortiz MD;  Location:  GI    ESOPHAGOSCOPY, GASTROSCOPY, DUODENOSCOPY (EGD), COMBINED N/A 9/6/2024    Procedure: Esophagoscopy, gastroscopy, duodenoscopy (EGD), combined;  Surgeon: Andrea Daniel MD;  Location:  GI    GYN SURGERY  1/10/75    tubal ligation     H ABLATION SVT  5/28/2015    stopped metoprolol    HERNIA REPAIR  2005    HYSTERECTOMY, ENDOSCOPIC, VAGINAL TRANSLUMINAL NATURAL ORIFICE APPROACH Bilateral 11/5/2024    Procedure: vaginal natural orifice transluminal endoscopic surgery vaginal hysterectomy, bilateral salpingo-oophorectomy, and posterior repair;  Surgeon: Shanique Paniagua MD;  Location:  OR    IMPLANT FILTER INFERIOR VENA CAVA  10/8/2012    taken out  1/4/12    INSERT TISSUE EXPANDER BREAST BILATERAL  9/5/2012    Procedure: INSERT TISSUE EXPANDER BREAST BILATERAL;;  Surgeon: Orlando Wall MD;  Location:  OR    MASTECTOMY SIMPLE, SENTINEL NODE, COMBINED  9/5/2012    Procedure: COMBINED MASTECTOMY SIMPLE, SENTINEL NODE;  BILATERAL MASTECTOMY WITH LEFT AXILLARY SENTINEL LYMPH NODE BIOPSY, BILATERAL TISSUE EXPANDER       MASTECTOMY, BILATERAL      PANNICULECTOMY N/A 4/5/2018    Procedure: PANNICULECTOMY;  PANNICULECTOMY ;  Surgeon: Orlando Wall MD;  Location:  OR    RECONSTRUCT BREAST BILATERAL, IMPLANT PROSTHESIS BILATERAL, COMBINED  5/22/2013    Procedure: COMBINED RECONSTRUCT BREAST BILATERAL, IMPLANT PROSTHESIS BILATERAL;  BILATERAL SECOND STAGE BREAST RECONSTRUCTION WITH SILICONE GEL IMPLANTS AND LIPOSUCTION;  Surgeon: Orlando Wall MD;  Location:  SD    RECONSTRUCT NIPPLE BILATERAL  8/22/2013    Procedure: RECONSTRUCT NIPPLE BILATERAL;  BILATERAL NIPPLE AREOLAR RECONSTRUCTION;  Surgeon: Orlando Wall MD;  Location:  SD    THYROIDECTOMY N/A 11/14/2023    Procedure: Left Thyroid Lobectomy and Autotransplant of Left Superior Parathyroid;  Surgeon: Haley Garcia MD;  [facility]    [facility]  2013       Current Medications:  Patient's Medications   New Prescriptions    No medications on file   Previous Medications    ACETAMINOPHEN (TYLENOL) 500 MG TABLET    Take 500-1,000 mg by mouth 2 times daily as needed for mild pain.    APIXABAN ANTICOAGULANT (ELIQUIS ANTICOAGULANT) 5 MG TABLET    Take 1 tablet (5 mg) by mouth 2 times daily    BLOOD GLUCOSE (NO BRAND SPECIFIED) LANCETS STANDARD    Use to test blood sugar 2 times daily or as directed.    BLOOD GLUCOSE (ONETOUCH VERIO IQ) TEST STRIP    USE TO TEST TWICE DAILY OR AS DIRECTED    CHOLECALCIFEROL (VITAMIN D) 50 MCG (2000 UT) CAPS    Take 1 capsule by mouth daily.    CLOBETASOL PROPIONATE (TEMOVATE) 0.05 % EXTERNAL CREAM    Apply twice daily as needed    ESCITALOPRAM (LEXAPRO) 20 MG TABLET    TAKE 1 TABLET(20 MG) BY MOUTH DAILY    TIOTROPIUM (SPIRIVA RESPIMAT) 2.5 MCG/ACT INHALER    Inhale 2 puffs into the lungs daily    VITAMIN B-12 (CYANOCOBALAMIN) 2500 MCG SUBLINGUAL TABLET    Take 1 tablet (2,500 mcg) by mouth daily.   Modified Medications    No medications on file   Discontinued Medications    No medications on file       ALLERGIES:  Allergies   Allergen Reactions    Bacitracin Hives    Nickel Swelling    Dust Mites Other (See Comments)     sneezing       Social History  Social History     Tobacco Use    Smoking status: Former     Current packs/day: 0.00     Average packs/day: 1.5 packs/day for 25.0 years (37.5 ttl pk-yrs)     Types: Cigarettes     Start date: 10/21/1961     Quit date: 10/21/1986     Years since quittin.2     Passive exposure: Current    Smokeless tobacco: Never    Tobacco comments:     quit    Vaping Use    Vaping status: Never Used    Passive vaping exposure: Yes   Substance Use Topics    Alcohol use: Not Currently    Drug use: No       Any Abnormal Recent Diagnostics? Yes  Hemoglobin 11.0 at preop exam on 12/3/2024: Patient has history of chronic iron deficiency anemia and chronic kidney  disease.  Dr. Richmond notified and is okay proceeding.  Hemoglobin A1c 5.6 on 10/8/2024: Shows excellent recent control of type 2 diabetes without medication.  Will monitor BG's closely during hospital stay.  Goal BG is <180.  Creatinine 1.71, GFR 30, BUN 30.6 on 12/3/2024: History of chronic kidney disease stage 3b.  We will check updated renal function as part of BMP on day of surgery and monitor postop renal function as needed.     Discharge Planning:   Discharge plan according to [facility]:    Home POD 1 with assist of Donna you.     10/25/24 1212   Discharge Planning   Patient/Family Anticipates Transition to home with family   Living Arrangements   People in Home child(wilmar), adult   Type of Residence Private Residence   Is your private residence a single family home or apartment? Single family home   Stair Railings, Within Home, Primary railings safe and in good condition   Bathroom Shower/Tub Tub/Shower unit   Support System   Support Systems Family Members;Children   Do you have someone available to stay with you one or two nights once you are home? Yes  (Daughter - Donna)       ANTON Beasley, CNP   Advanced Practice Nurse Navigator- Orthopedics  [facility]   Phone: [phone]

## 2025-01-22 ENCOUNTER — NON-APPOINTMENT (OUTPATIENT)
Age: 50
End: 2025-01-22

## 2025-01-23 ENCOUNTER — APPOINTMENT (OUTPATIENT)
Dept: ORTHOPEDIC SURGERY | Facility: CLINIC | Age: 50
End: 2025-01-23
Payer: COMMERCIAL

## 2025-01-23 DIAGNOSIS — Z96.651 PRESENCE OF RIGHT ARTIFICIAL KNEE JOINT: ICD-10-CM

## 2025-01-23 PROCEDURE — 99024 POSTOP FOLLOW-UP VISIT: CPT

## 2025-03-14 ENCOUNTER — NON-APPOINTMENT (OUTPATIENT)
Age: 50
End: 2025-03-14

## 2025-03-27 ENCOUNTER — APPOINTMENT (OUTPATIENT)
Dept: ORTHOPEDIC SURGERY | Facility: CLINIC | Age: 50
End: 2025-03-27
Payer: COMMERCIAL

## 2025-03-27 DIAGNOSIS — Z96.651 PRESENCE OF RIGHT ARTIFICIAL KNEE JOINT: ICD-10-CM

## 2025-03-27 PROCEDURE — 99213 OFFICE O/P EST LOW 20 MIN: CPT

## (undated) DEVICE — DRSG DERMABOND 0.7ML

## (undated) DEVICE — SOL IRR POUR H2O 500ML

## (undated) DEVICE — GLV 7 PROTEXIS (WHITE)

## (undated) DEVICE — TROCAR COVIDIEN VERSAONE BLADED FIXATION 11MM STANDARD

## (undated) DEVICE — WARMING BLANKET FULL ADULT

## (undated) DEVICE — POSITIONER FOAM EGG CRATE ULNAR 2PCS (PINK)

## (undated) DEVICE — SOL IRR POUR NS 0.9% 1000ML

## (undated) DEVICE — TUBING SUCTION NONCONDUCTIVE 6MM X 12FT

## (undated) DEVICE — PROTECTOR HEEL / ELBOW FLUFFY

## (undated) DEVICE — UTERINE MANIPULATOR COOPER SURGICAL 5MM 33CM GREEN

## (undated) DEVICE — SHEARS COVIDIEN ENDO SHEAR 5MM X 31CM W UNIPOLAR CAUTERY

## (undated) DEVICE — TUBING OLYMPUS INSUFFLATION

## (undated) DEVICE — DRSG TEGADERM 2.5X3"

## (undated) DEVICE — POSITIONER STRAP ARMBOARD VELCRO TS-30

## (undated) DEVICE — ENDOCATCH 10MM SPECIMEN POUCH

## (undated) DEVICE — DRSG KERLIX ROLL 4.5"

## (undated) DEVICE — GLV 7 PROTEXIS (CREAM) MICRO

## (undated) DEVICE — TROCAR COVIDIEN ENDO CLOSE SUTURING DEVICE

## (undated) DEVICE — LIGASURE BLUNT TIP 37CM

## (undated) DEVICE — SUT PDS II 0 36" CT-1

## (undated) DEVICE — POSITIONER PINK PAD PIGAZZI SYSTEM

## (undated) DEVICE — PACK GENERAL LAPAROSCOPY

## (undated) DEVICE — UTERINE MANIPULATOR CONMED VCARE SM 32MM

## (undated) DEVICE — PROTECTOR HEEL / ELBOW

## (undated) DEVICE — APPLICATOR FOR ARISTA XL 38CM

## (undated) DEVICE — BASIN SET SINGLE

## (undated) DEVICE — CANISTER DISPOSABLE THIN WALL 3000CC

## (undated) DEVICE — SUT VICRYL 0 27" UR-6

## (undated) DEVICE — VENODYNE/SCD SLEEVE CALF MEDIUM

## (undated) DEVICE — SUT VICRYL 0 18" TIES UNDYED

## (undated) DEVICE — TROCAR COVIDIEN VERSAONE BLUNT TIP HASSAN 12MM

## (undated) DEVICE — TIP METZENBAUM SCISSOR MONOPOLAR ENDOCUT (ORANGE)

## (undated) DEVICE — TUBING HYDRO-SURG PLUS IRRIGATOR W SMOKEVAC & PROBE

## (undated) DEVICE — PREP BETADINE SPONGE STICKS

## (undated) DEVICE — TROCAR COVIDIEN VERSAPORT BLADELESS OPTICAL 5MM STANDARD

## (undated) DEVICE — SUT VICRYL PLUS 0 18" CT-1 (POP-OFF)

## (undated) DEVICE — SOL IRR BAG NS 0.9% 3000ML

## (undated) DEVICE — TUBING INSUFFLATION LAP FILTER 10FT

## (undated) DEVICE — DISSECTOR ENDOSCOPIC KITTNER SINGLE TIP

## (undated) DEVICE — LIGASURE MARYLAND 37CM

## (undated) DEVICE — SUT PDS II 2-0 27" CT-1

## (undated) DEVICE — SUT PDS II 0 27" CT-2

## (undated) DEVICE — BLADE SURGICAL #15 CARBON

## (undated) DEVICE — D HELP - CLEARVIEW CLEARIFY SYSTEM

## (undated) DEVICE — ELCTR BOVIE TIP BLADE INSULATED 2.75" EDGE

## (undated) DEVICE — DRSG PAD SANITARY OB

## (undated) DEVICE — PACK PERI GYN

## (undated) DEVICE — GLV 6.5 PROTEXIS (CREAM) MICRO

## (undated) DEVICE — DRAPE 3/4 SHEET 52X76"

## (undated) DEVICE — SYR LUER LOK 20CC

## (undated) DEVICE — SCOPE WARMER SEAL DISP

## (undated) DEVICE — ELCTR GROUNDING PAD ADULT COVIDIEN

## (undated) DEVICE — BAG SPECIMEN RETRIEVAL ENDOBAG 3X6"

## (undated) DEVICE — SUT MONOCRYL 4-0 27" PS-2 UNDYED

## (undated) DEVICE — DRSG TELFA 3 X 8

## (undated) DEVICE — SUT VICRYL 0 27" CT-1 UNDYED

## (undated) DEVICE — SOL IRR POUR NS 0.9% 500ML

## (undated) DEVICE — DRSG STERISTRIPS 0.5 X 4"

## (undated) DEVICE — PACK D&C

## (undated) DEVICE — GLV 7.5 PROTEXIS (WHITE)

## (undated) DEVICE — TROCAR APPLIED MEDICAL KII BALLOON BLUNT TIP 12MM X 130MM

## (undated) DEVICE — SUT VICRYL 0 18" CT-1 UNDYED (POP-OFF)

## (undated) DEVICE — DRSG BENZOIN 0.6CC

## (undated) DEVICE — ELCTR DISSECTOR ULTRASONIC CORDLESS CVD JAW 5MM-39CM

## (undated) DEVICE — SMOKE EVACUTATION SYS LAPROSCOPIC AC/PA

## (undated) DEVICE — TROCAR COVIDIEN BLUNT TIP HASSAN 10MM

## (undated) DEVICE — GELPOINT V-PATH TRANSVAGINAL ACCESS 9.5CM

## (undated) DEVICE — FOLEY TRAY 16FR 5CC LF UMETER CLOSED